# Patient Record
Sex: FEMALE | Race: WHITE | NOT HISPANIC OR LATINO | Employment: FULL TIME | URBAN - METROPOLITAN AREA
[De-identification: names, ages, dates, MRNs, and addresses within clinical notes are randomized per-mention and may not be internally consistent; named-entity substitution may affect disease eponyms.]

---

## 2021-02-17 ENCOUNTER — OFFICE VISIT (OUTPATIENT)
Dept: GASTROENTEROLOGY | Facility: CLINIC | Age: 21
End: 2021-02-17

## 2021-02-17 VITALS
HEIGHT: 63 IN | WEIGHT: 151 LBS | BODY MASS INDEX: 26.75 KG/M2 | TEMPERATURE: 97.8 F | SYSTOLIC BLOOD PRESSURE: 143 MMHG | HEART RATE: 95 BPM | DIASTOLIC BLOOD PRESSURE: 89 MMHG

## 2021-02-17 DIAGNOSIS — R11.2 CANNABINOID HYPEREMESIS SYNDROME: ICD-10-CM

## 2021-02-17 DIAGNOSIS — F12.90 CANNABINOID HYPEREMESIS SYNDROME: ICD-10-CM

## 2021-02-17 DIAGNOSIS — K21.9 GASTROESOPHAGEAL REFLUX DISEASE, UNSPECIFIED WHETHER ESOPHAGITIS PRESENT: Primary | ICD-10-CM

## 2021-02-17 DIAGNOSIS — R11.2 NON-INTRACTABLE VOMITING WITH NAUSEA, UNSPECIFIED VOMITING TYPE: ICD-10-CM

## 2021-02-17 DIAGNOSIS — F41.9 ANXIETY AND DEPRESSION: ICD-10-CM

## 2021-02-17 DIAGNOSIS — F32.A ANXIETY AND DEPRESSION: ICD-10-CM

## 2021-02-17 DIAGNOSIS — R13.19 ESOPHAGEAL DYSPHAGIA: ICD-10-CM

## 2021-02-17 DIAGNOSIS — F50.02 ANOREXIA NERVOSA, BINGE-EATING PURGING TYPE: ICD-10-CM

## 2021-02-17 PROCEDURE — 99204 OFFICE O/P NEW MOD 45 MIN: CPT | Performed by: INTERNAL MEDICINE

## 2021-02-17 RX ORDER — ONDANSETRON HYDROCHLORIDE 8 MG/1
1 TABLET, FILM COATED ORAL DAILY
Status: ON HOLD | COMMUNITY
Start: 2021-02-08 | End: 2022-06-19

## 2021-02-17 RX ORDER — ETHINYL ESTRADIOL/DROSPIRENONE 0.02-3(28)
1 TABLET ORAL DAILY
COMMUNITY
Start: 2021-01-31 | End: 2021-06-24 | Stop reason: SDUPTHER

## 2021-02-17 RX ORDER — METOCLOPRAMIDE 10 MG/1
TABLET ORAL
Qty: 90 TABLET | Refills: 2 | Status: SHIPPED | OUTPATIENT
Start: 2021-02-17 | End: 2021-06-24

## 2021-02-17 RX ORDER — HYDROXYZINE HYDROCHLORIDE 25 MG/1
1 TABLET, FILM COATED ORAL DAILY
Status: ON HOLD | COMMUNITY
Start: 2021-02-11 | End: 2022-06-19

## 2021-02-17 NOTE — PROGRESS NOTES
GASTROENTEROLOGY OFFICE NOTE  Margaret Palmer  [id number]  2000    CARE TEAM  Patient Care Team:  Provider, No Known as PCP - General    No ref. provider found     Chief Complaint   Patient presents with   • Abdominal Pain   • Nausea        HISTORY OF PRESENT ILLNESS:  Very pleasant 20-year-old white female referred to me by her neighbor Dr. Donn Garcia.    She presents with complaints of nausea and vomiting.    She has a history of eating disorder with history of bulimia, purging and in addition to this has issues with anxiety and depression for which she self medicates with marijuana on a regular basis.    She has been having problems with nausea for many years on a daily basis.  She has had problems with vomiting which was worse when she was in high school and college and over the past year after graduating from college has not been as problematic but the nausea continues to be particularly troublesome for her.    She does have regurgitation of food sometimes ingested night prior and complains of postprandial abdominal fullness, bloating, distention and regurgitation.    2 weeks ago she discontinued cannabis use and is feeling better in terms of her nausea.    In terms of her eating disorder she very rarely purges anymore and struggles more with the bulimia aspect of her eating disorder.    She does have some issues with feeling like bread and pills get stuck or hung up in her esophagus and admits having to chew carefully, eat slowly and having to drink fluids to avoid the sensation of food getting impacted in her esophagus.  She denies odynophagia or early satiety and has not had any unexplained weight loss nor any melena/bright red blood per rectum.    She is here today for further work-up and evaluation of these ongoing GI complaints not the least of which is her persistent nausea which has improved with cannabis cessation.    PAST MEDICAL HISTORY  Past Medical History:   Diagnosis Date   • Anxiety  and depression    • Eating disorder         PAST SURGICAL HISTORY  Past Surgical History:   Procedure Laterality Date   • ANKLE SURGERY     • WISDOM TOOTH EXTRACTION          MEDICATIONS:    Current Outpatient Medications:   •  hydrOXYzine (ATARAX) 25 MG tablet, Take 1 tablet by mouth Daily., Disp: , Rfl:   •  Latrice 3-0.02 MG per tablet, Take 1 tablet by mouth Daily., Disp: , Rfl:   •  ondansetron (ZOFRAN) 8 MG tablet, Take 1 tablet by mouth Daily., Disp: , Rfl:     ALLERGIES  No Known Allergies    FAMILY HISTORY:  Family History   Problem Relation Age of Onset   • Colon cancer Neg Hx    • Colon polyps Neg Hx        SOCIAL HISTORY  Social History     Socioeconomic History   • Marital status: Single     Spouse name: Not on file   • Number of children: Not on file   • Years of education: Not on file   • Highest education level: Not on file   Tobacco Use   • Smoking status: Never Smoker   • Smokeless tobacco: Never Used   Substance and Sexual Activity   • Alcohol use: Yes     Comment: rarely   • Drug use: Yes     Types: Marijuana   • Sexual activity: Yes     Birth control/protection: OCP     Socioeconomic History:  She graduated with a chemistry degree from college last year but 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  She is single and does not have any children.  She rarely drinks alcoholic beverages and does not smoke cigarettes.  She does smoke pot regularly up until about 2 weeks ago.       REVIEW OF SYSTEMS  Review of Systems   Constitutional: Positive for fatigue. Negative for activity change, appetite change, chills, diaphoresis, fever, unexpected weight gain and unexpected weight loss.   HENT: Negative for congestion, dental problem, drooling, ear discharge, ear pain, facial swelling, hearing loss, mouth sores, nosebleeds, postnasal drip, rhinorrhea, sinus pressure, sneezing, sore throat, swollen glands, tinnitus, trouble swallowing and voice change.    Respiratory: Negative for apnea,  "cough, choking, chest tightness, shortness of breath, wheezing and stridor.    Cardiovascular: Negative for chest pain, palpitations and leg swelling.   Gastrointestinal: Positive for abdominal pain and nausea. Negative for abdominal distention, anal bleeding, blood in stool, constipation, diarrhea, rectal pain, vomiting, GERD and indigestion.   Endocrine: Negative for cold intolerance, heat intolerance, polydipsia, polyphagia and polyuria.   Musculoskeletal: Negative for arthralgias, back pain, gait problem, joint swelling, myalgias, neck pain, neck stiffness and bursitis.   Allergic/Immunologic: Negative for environmental allergies, food allergies and immunocompromised state.   Neurological: Negative for dizziness, tremors, seizures, syncope, facial asymmetry, speech difficulty, weakness, light-headedness, numbness, headache, memory problem and confusion.   Hematological: Negative for adenopathy. Does not bruise/bleed easily.   Psychiatric/Behavioral: Negative for agitation, behavioral problems, decreased concentration, dysphoric mood, hallucinations, self-injury, sleep disturbance, suicidal ideas, negative for hyperactivity, depressed mood and stress. The patient is not nervous/anxious.      I reviewed the above-noted review of systems.  Pertinent/active issues are those noted today's HPI    PHYSICAL EXAM   /89 (BP Location: Right arm, Patient Position: Sitting, Cuff Size: Adult)   Pulse 95   Temp 97.8 °F (36.6 °C) (Temporal)   Ht 160 cm (63\")   Wt 68.5 kg (151 lb)   BMI 26.75 kg/m²   General: Alert and oriented x 3. In no apparent or acute distress.  and No stigmata of chronic liver disease  HEENT: Anicteric sclera.  Wearing facemask.  Neck: Supple. Without lymphadenopathy  CV: Regular rate and rhythm, S1, S2  Lungs: Clear to ausculation. Without rales, rhonchi and wheezing  Abdomen:  Soft,non-distended without palpable masses or hepatosplenomeagaly, areas of rebound tenderness or guarding. "   Extremeties: without clubbing, cyanosis or edema  Neurologic:  Alert and oriented x 3 without focal motor or sensory deficits  Rectal exam: deferred       ASSESSMENT  1.-Cannabis induced hyperemesis syndrome  2.-Eating disorder  3.-Gastroparesis  4.-Dysphagia to solids  5.-Chronic GERD.  Multifactorial.  Rule out erosive esophagitis, eosinophilic esophagitis, H. pylori gastritis,  6.-Intermittent dysphagia to solids likely peptic stricture rule out eosinophilic esophagitis  7.-Anxiety depression.  Patient to address this formally with her primary care provider    PLAN  1.-Patient is counseled to establish herself with a primary care physician to better manage her anxiety and depression without the need for cannabis.  She understands importance of doing so particularly given the fact that she has noted improvement after discontinuing cannabis  2.-Cannabis cessation  3.-EGD for esophageal dilation and exclusion of eosinophilia, esophagitis, Gar's esophagus, H. pylori gastritis and celiac disease  4.-Further recommendation deferred pending her clinical progress.    Monroe Clements MD  2/17/2021   15:03 EST

## 2021-02-18 PROBLEM — F50.02 ANOREXIA NERVOSA, BINGE-EATING PURGING TYPE: Status: ACTIVE | Noted: 2021-02-18

## 2021-02-18 PROBLEM — R11.2 CANNABINOID HYPEREMESIS SYNDROME: Status: ACTIVE | Noted: 2021-02-18

## 2021-02-18 PROBLEM — R11.10 NON-INTRACTABLE VOMITING: Status: ACTIVE | Noted: 2021-02-18

## 2021-02-18 PROBLEM — F32.A ANXIETY AND DEPRESSION: Status: ACTIVE | Noted: 2021-02-18

## 2021-02-18 PROBLEM — F41.9 ANXIETY AND DEPRESSION: Status: ACTIVE | Noted: 2021-02-18

## 2021-02-18 PROBLEM — F50.029 ANOREXIA NERVOSA, BINGE-EATING PURGING TYPE: Status: ACTIVE | Noted: 2021-02-18

## 2021-02-18 PROBLEM — K21.9 GASTROESOPHAGEAL REFLUX DISEASE: Status: ACTIVE | Noted: 2021-02-18

## 2021-02-18 PROBLEM — R13.19 ESOPHAGEAL DYSPHAGIA: Status: ACTIVE | Noted: 2021-02-18

## 2021-02-18 PROBLEM — F12.90 CANNABINOID HYPEREMESIS SYNDROME: Status: ACTIVE | Noted: 2021-02-18

## 2021-02-25 ENCOUNTER — PRIOR AUTHORIZATION (OUTPATIENT)
Dept: GASTROENTEROLOGY | Facility: CLINIC | Age: 21
End: 2021-02-25

## 2021-03-01 ENCOUNTER — APPOINTMENT (OUTPATIENT)
Dept: PREADMISSION TESTING | Facility: HOSPITAL | Age: 21
End: 2021-03-01

## 2021-03-01 LAB — SARS-COV-2 RNA RESP QL NAA+PROBE: NOT DETECTED

## 2021-03-01 PROCEDURE — U0004 COV-19 TEST NON-CDC HGH THRU: HCPCS

## 2021-03-01 PROCEDURE — C9803 HOPD COVID-19 SPEC COLLECT: HCPCS

## 2021-03-04 ENCOUNTER — OUTSIDE FACILITY SERVICE (OUTPATIENT)
Dept: GASTROENTEROLOGY | Facility: CLINIC | Age: 21
End: 2021-03-04

## 2021-03-04 PROCEDURE — 43239 EGD BIOPSY SINGLE/MULTIPLE: CPT | Performed by: INTERNAL MEDICINE

## 2021-03-04 PROCEDURE — 43249 ESOPH EGD DILATION <30 MM: CPT | Performed by: INTERNAL MEDICINE

## 2021-03-04 PROCEDURE — 88305 TISSUE EXAM BY PATHOLOGIST: CPT | Performed by: INTERNAL MEDICINE

## 2021-03-05 ENCOUNTER — LAB REQUISITION (OUTPATIENT)
Dept: LAB | Facility: HOSPITAL | Age: 21
End: 2021-03-05

## 2021-03-05 DIAGNOSIS — R11.2 NAUSEA WITH VOMITING, UNSPECIFIED: ICD-10-CM

## 2021-03-08 LAB
CYTO UR: NORMAL
LAB AP CASE REPORT: NORMAL
LAB AP CLINICAL INFORMATION: NORMAL
LAB AP SPECIAL STAINS: NORMAL
PATH REPORT.FINAL DX SPEC: NORMAL
PATH REPORT.GROSS SPEC: NORMAL

## 2021-06-24 ENCOUNTER — OFFICE VISIT (OUTPATIENT)
Dept: OBSTETRICS AND GYNECOLOGY | Facility: CLINIC | Age: 21
End: 2021-06-24

## 2021-06-24 VITALS
HEIGHT: 63 IN | BODY MASS INDEX: 27.68 KG/M2 | WEIGHT: 156.2 LBS | SYSTOLIC BLOOD PRESSURE: 114 MMHG | DIASTOLIC BLOOD PRESSURE: 70 MMHG

## 2021-06-24 DIAGNOSIS — Z11.3 SCREENING EXAMINATION FOR STD (SEXUALLY TRANSMITTED DISEASE): ICD-10-CM

## 2021-06-24 DIAGNOSIS — Z30.40 ENCOUNTER FOR REFILL OF PRESCRIPTION FOR CONTRACEPTION: ICD-10-CM

## 2021-06-24 DIAGNOSIS — R30.0 DYSURIA: ICD-10-CM

## 2021-06-24 DIAGNOSIS — R39.15 URINARY URGENCY: ICD-10-CM

## 2021-06-24 DIAGNOSIS — N39.0 URINARY TRACT INFECTION WITHOUT HEMATURIA, SITE UNSPECIFIED: ICD-10-CM

## 2021-06-24 DIAGNOSIS — Z01.419 ENCOUNTER FOR ANNUAL ROUTINE GYNECOLOGICAL EXAMINATION: Primary | ICD-10-CM

## 2021-06-24 PROCEDURE — 99213 OFFICE O/P EST LOW 20 MIN: CPT | Performed by: OBSTETRICS & GYNECOLOGY

## 2021-06-24 PROCEDURE — 99395 PREV VISIT EST AGE 18-39: CPT | Performed by: OBSTETRICS & GYNECOLOGY

## 2021-06-24 RX ORDER — NITROFURANTOIN 25; 75 MG/1; MG/1
100 CAPSULE ORAL 2 TIMES DAILY
Qty: 14 CAPSULE | Refills: 0 | Status: ON HOLD | OUTPATIENT
Start: 2021-06-24 | End: 2022-06-19

## 2021-06-24 RX ORDER — ETHINYL ESTRADIOL/DROSPIRENONE 0.02-3(28)
1 TABLET ORAL DAILY
Qty: 84 TABLET | Refills: 3 | Status: SHIPPED | OUTPATIENT
Start: 2021-06-24 | End: 2022-06-24

## 2021-06-24 NOTE — PROGRESS NOTES
GYN Annual Exam     CC- Here for annual exam.     Subjective     HPI  Margaret Palmer is a 20 y.o. female established patient who presents for annual well woman exam. Her periods are regular every 28-30 days, lasting 5-6 days and are moderate and clots are present.  She reports mild-to-moderate dysmenorrhea.  Partner Status: Marital Status: single.  New Partners since last visit: yes.  Condom usage with IC: always.    The patient reports she has a urinary tract infection; symptoms of dysuria and urinary urgency present since Tuesday. A month ago, she had 2 UTIs back-to-back. She is currently taking AZO; reports urine is bright orange today. Patient states she had extensive STD testing with her UTI last month.    She exercises regularly: yes.  She has concerns about domestic violence: no.    OB History        0    Para   0    Term   0       0    AB   0    Living   0       SAB   0    TAB   0    Ectopic   0    Molar   0    Multiple   0    Live Births   0                Current contraception: OCP (estrogen/progesterone)  History of abnormal Pap smear: no  Family history of uterine, colon or ovarian cancer: no  Family history of breast cancer: yes - [facility]  H/o STDs: no  Last pap: 2020 - negative, G&C negative  Gardasil:completed  Thromboembolic Disease: none    Health Maintenance   Topic Date Due   • Annual Gynecologic Pelvic and Breast Exam  Never done   • ANNUAL PHYSICAL  Never done   • HPV VACCINES (1 - 2-dose series) Never done   • TDAP/TD VACCINES (1 - Tdap) Never done   • HEPATITIS C SCREENING  Never done   • CHLAMYDIA SCREENING  Never done   • COVID-19 Vaccine (2 - Pfizer 2-dose series) 2021   • INFLUENZA VACCINE  2021   • PAP SMEAR  2023   • Pneumococcal Vaccine 0-64  Aged Out   • MENINGOCOCCAL VACCINE  Aged Out       The following portions of the patient's history were reviewed and updated as appropriate: allergies, current medications, past family history, past medical  "history, past social history, past surgical history and problem list.    Review of Systems  Review of Systems   Constitutional: Negative.    HENT: Negative.    Eyes: Negative.    Respiratory: Negative.    Cardiovascular: Negative.    Gastrointestinal: Negative.    Endocrine: Negative.    Genitourinary: Positive for dysuria.   Musculoskeletal: Negative.    Skin: Negative.    Allergic/Immunologic: Negative.    Neurological: Negative.    Hematological: Negative.    Psychiatric/Behavioral: Negative.        I have reviewed and agree with the HPI, ROS, and historical information as entered above. Sharmin Preston MD      Past Medical History:   Diagnosis Date   • Anxiety and depression    • Gardasil injection series complete    • Hirsutism    • History of eating disorder    • PCOS (polycystic ovarian syndrome)         Past Surgical History:   Procedure Laterality Date   • ANKLE SURGERY Right    • ENDOSCOPY  03/2021    for GI issues; normal, per patient   • WISDOM TOOTH EXTRACTION            Objective   /70 (BP Location: Right arm, Patient Position: Sitting, Cuff Size: Adult)   Ht 160 cm (63\")   Wt 70.9 kg (156 lb 3.2 oz)   LMP 06/19/2021 (Exact Date)   Breastfeeding No   BMI 27.67 kg/m²     Physical Exam  Vitals and nursing note reviewed. Exam conducted with a chaperone present.   Constitutional:       General: She is awake.   HENT:      Head: Normocephalic and atraumatic.   Neck:      Thyroid: No thyroid mass, thyromegaly or thyroid tenderness.   Cardiovascular:      Rate and Rhythm: Normal rate.      Heart sounds: No murmur heard.   No friction rub. No gallop.    Pulmonary:      Effort: Pulmonary effort is normal.      Breath sounds: Normal breath sounds. No stridor. No wheezing, rhonchi or rales.   Chest:      Chest wall: No mass or tenderness.      Breasts:         Right: Normal. No bleeding, inverted nipple, mass, nipple discharge, skin change or tenderness.         Left: Normal. No bleeding, inverted " nipple, mass, nipple discharge, skin change or tenderness.   Abdominal:      Palpations: Abdomen is soft.      Tenderness: There is no guarding or rebound.      Hernia: There is no hernia in the left inguinal area or right inguinal area.   Genitourinary:     General: Normal vulva.      Pubic Area: No rash.       Labia:         Right: No rash, tenderness, lesion or injury.         Left: No rash, tenderness, lesion or injury.       Urethra: No prolapse, urethral swelling or urethral lesion.      Vagina: No foreign body. No vaginal discharge, erythema, tenderness, bleeding or lesions.      Cervix: No cervical motion tenderness, discharge, friability, lesion, erythema or cervical bleeding.      Uterus: Normal. Not deviated, not enlarged, not fixed and not tender.       Adnexa: Right adnexa normal and left adnexa normal.        Right: No mass, tenderness or fullness.          Left: No mass, tenderness or fullness.        Rectum: No external hemorrhoid.   Musculoskeletal:      Cervical back: Normal range of motion.   Lymphadenopathy:      Upper Body:      Right upper body: No supraclavicular or axillary adenopathy.      Left upper body: No supraclavicular or axillary adenopathy.   Skin:     General: Skin is warm.   Neurological:      Mental Status: She is alert and oriented to person, place, and time.   Psychiatric:         Mood and Affect: Mood and affect normal.         Speech: Speech normal.          Assessment   Assessment  Problem List Items Addressed This Visit     None      Visit Diagnoses     Encounter for annual routine gynecological examination    -  Primary    Relevant Orders    Pap IG, Ct-Ng, Rfx HPV ASCU    Screening examination for STD (sexually transmitted disease)        Encounter for refill of prescription for contraception        Relevant Medications    Latrice 3-0.02 MG per tablet    Urinary urgency        Relevant Orders    Urine Culture - Urine, Urine, Clean Catch    Dysuria        Relevant Orders     Urine Culture - Urine, Urine, Clean Catch    Urinary tract infection without hematuria, site unspecified        Relevant Medications    nitrofurantoin, macrocrystal-monohydrate, (Macrobid) 100 MG capsule            Assessment     Problem List Items Addressed This Visit     None      Visit Diagnoses     Encounter for annual routine gynecological examination    -  Primary    Relevant Orders    Pap IG, Ct-Ng, Rfx HPV ASCU    Screening examination for STD (sexually transmitted disease)        Encounter for refill of prescription for contraception        Relevant Medications    Latrice 3-0.02 MG per tablet    Urinary urgency        Relevant Orders    Urine Culture - Urine, Urine, Clean Catch    Dysuria        Relevant Orders    Urine Culture - Urine, Urine, Clean Catch    Urinary tract infection without hematuria, site unspecified        Relevant Medications    nitrofurantoin, macrocrystal-monohydrate, (Macrobid) 100 MG capsule            Plan     1. Encouraged use of condoms for STD prevention.  2. Reviewed monthly self breast exams.  Instructed to call with lumps, pain, or breast discharge.    3. RTC in 1 year or PRN with problems  4. Doing well on OCPs  5. Pt with recent UTI that returned just after completing antibiotics, but per pt the culture was negative.  Symptomatic today, so we will treat with antibiotics and culture sent.  We will contact the patient with results.  We discussed voiding after intercourse and possible prophylactic antibiotics with intercourse if her symptoms seem to be related to intercourse.  We also discussed possible referral to urology to evaluate further.  Questionable bladder spasms.  Patient currently on Azo today.         Sharmin Preston MD  06/24/2021

## 2021-06-24 NOTE — PATIENT INSTRUCTIONS
Breast Self-Awareness  Breast self-awareness is knowing how your breasts look and feel. Doing breast self-awareness is important. It allows you to catch a breast problem early while it is still small and can be treated. All women should do breast self-awareness, including women who have had breast implants. Tell your doctor if you notice a change in your breasts.  What you need:  · A mirror.  · A well-lit room.  How to do a breast self-exam  A breast self-exam is one way to learn what is normal for your breasts and to check for changes. To do a breast self-exam:  Look for changes    1. Take off all the clothes above your waist.  2.  front of a mirror in a room with good lighting.  3. Put your hands on your hips.  4. Push your hands down.  5. Look at your breasts and nipples in the mirror to see if one breast or nipple looks different from the other. Check to see if:  ? The shape of one breast is different.  ? The size of one breast is different.  ? There are wrinkles, dips, and bumps in one breast and not the other.  6. Look at each breast for changes in the skin, such as:  ? Redness.  ? Scaly areas.  7. Look for changes in your nipples, such as:  ? Liquid around the nipples.  ? Bleeding.  ? Dimpling.  ? Redness.  ? A change in where the nipples are.  Feel for changes    1. Lie on your back on the floor.  2. Feel each breast. To do this, follow these steps:  ? Pick a breast to feel.  ? Put the arm closest to that breast above your head.  ? Use your other arm to feel the nipple area of your breast. Feel the area with the pads of your three middle fingers by making small circles with your fingers. For the first [place], press lightly. For the second [place], press harder. For the third [place], press even harder.  ? Keep making circles with your fingers at the different pressures as you move down your breast. Stop when you feel your ribs.  ? Move your fingers a little toward the center of your body.  ? Start  making circles with your fingers again, this time going up until you reach your collarbone.  ? Keep making up-and-down circles until you reach your armpit. Remember to keep using the three pressures.  ? Feel the other breast in the same way.  3. Sit or  the tub or shower.  4. With soapy water on your skin, feel each breast the same way you did in step 2 when you were lying on the floor.  Write down what you find  Writing down what you find can help you remember what to tell your doctor. Write down:  · What is normal for each breast.  · Any changes you find in each breast, including:  ? The kind of changes you find.  ? Whether you have pain.  ? Size and location of any lumps.  · When you last had your menstrual period.  General tips  · Check your breasts every month.  · If you are breastfeeding, the best time to check your breasts is after you feed your baby or after you use a breast pump.  · If you get menstrual periods, the best time to check your breasts is 5-7 days after your menstrual period is over.  · With time, you will become comfortable with the self-exam, and you will begin to know if there are changes in your breasts.  Contact a doctor if you:  · See a change in the shape or size of your breasts or nipples.  · See a change in the skin of your breast or nipples, such as red or scaly skin.  · Have fluid coming from your nipples that is not normal.  · Find a lump or thick area that was not there before.  · Have pain in your breasts.  · Have any concerns about your breast health.  Summary  · Breast self-awareness includes looking for changes in your breasts, as well as feeling for changes within your breasts.  · Breast self-awareness should be done in front of a mirror in a well-lit room.  · You should check your breasts every month. If you get menstrual periods, the best time to check your breasts is 5-7 days after your menstrual period is over.  · Let your doctor know of any changes you see in your  breasts, including changes in size, changes on the skin, pain or tenderness, or fluid from your nipples that is not normal.  This information is not intended to replace advice given to you by your health care provider. Make sure you discuss any questions you have with your health care provider.  Document Revised: 08/06/2019 Document Reviewed: 08/06/2019  Elsevier Patient Education © 2021 [employer]

## 2021-06-27 LAB
BACTERIA UR CULT: ABNORMAL
BACTERIA UR CULT: ABNORMAL

## 2022-01-05 ENCOUNTER — TELEPHONE (OUTPATIENT)
Dept: FAMILY MEDICINE CLINIC | Facility: CLINIC | Age: 22
End: 2022-01-05

## 2022-01-05 ENCOUNTER — TELEPHONE (OUTPATIENT)
Dept: OBSTETRICS AND GYNECOLOGY | Facility: CLINIC | Age: 22
End: 2022-01-05

## 2022-01-05 NOTE — TELEPHONE ENCOUNTER
Patient called and stated that she is overseas and is needing a letter of Covid recovery from a doctor in order to be able to fly back home to states. Patient was diagnosed with Covid overseas and quarantined there - only had mild symptoms, did not see doctor there.

## 2022-01-05 NOTE — TELEPHONE ENCOUNTER
"Spoke with patient who explains that she started having Covid symptoms on 12/27/2021 and tested positive for Covid on 12/30/2021 while in [place]. Her symptoms included a headache and congestion. Denies ever having a fever. She states that she has not had any symptoms since 01/01/2022. Per patient, starting on day 6 of quarantine, they can start taking \"lateral flow tests\" to see if they can come out of isolation sooner than the 10 days. She has consistently tested positive on those. She is out of quarantine tomorrow. She is requesting a letter of recovery to give the airport so that she is able to fly home. She is unable to even schedule a flight out until she receives this letter or has a negative antigen test.     She states that she does not have a PCP and has reached out to a couple offices in [place], but they are denying her the letter since she has never seen them in office. Last office visit with Dr. Preston was 06/24/2021.     Patient states she has been unable to see a PCP in [place] because their appts are weeks out. She also doesn't have a means of transportation to the office as she can't use public transportation due to her being quarantined. She said they have online requests but they cost around $175, so she was trying to avoid that if at all possible.     I advised the patient that I would discuss with Dr. Preston tomorrow and CB with plan of care.   "

## 2022-01-05 NOTE — TELEPHONE ENCOUNTER
Caller: ANNIKA MARINO     Relationship: FATHER     Best call back number: (PATIENT) [phone]    What is the best time to reach you: ANYTIME     Who are you requesting to speak with (clinical staff, provider,  specific staff member): CLINICAL STAFF     What was the call regarding: PATIENT'S FATHER IS ASKING FOR ASSISTANCE WITH HIS DAUGHTER WHO IS GOING TO BECOME A NEW PATIENT. SHE IS NEEDING A CERTIFICATE OF RECOVERY. SHE CONTACTED COVID WHILE ON A TRIP TO THE [place]. SHE IS UNABLE TO DO SO IN THE [place] SUCCESSFULLY. THEY ARE HOPING THAT DR. SALAS WOULD BE ABLE TO HELP WITH THIS PRIOR TO SEEING HER.     Do you require a callback: YES

## 2022-01-06 ENCOUNTER — TELEPHONE (OUTPATIENT)
Dept: OBSTETRICS AND GYNECOLOGY | Facility: CLINIC | Age: 22
End: 2022-01-06

## 2022-01-06 NOTE — TELEPHONE ENCOUNTER
Spoke with Dr. Preston about getting the patient a letter of recovery to permit her to fly back to the [place]. Patient informed that Dr. Preston wrote the letter and the signed document is being sent to her email.     Patient V/U.

## 2022-06-18 ENCOUNTER — HOSPITAL ENCOUNTER (OUTPATIENT)
Facility: HOSPITAL | Age: 22
Setting detail: OBSERVATION
Discharge: HOME | End: 2022-06-20
Attending: EMERGENCY MEDICINE | Admitting: INTERNAL MEDICINE

## 2022-06-18 ENCOUNTER — APPOINTMENT (OUTPATIENT)
Dept: CT IMAGING | Facility: HOSPITAL | Age: 22
End: 2022-06-18

## 2022-06-18 DIAGNOSIS — N10 PYELONEPHRITIS, ACUTE: Primary | ICD-10-CM

## 2022-06-18 LAB
ALBUMIN SERPL-MCNC: 3.2 G/DL (ref 3.5–5.2)
ALBUMIN/GLOB SERPL: 1.1 G/DL
ALP SERPL-CCNC: 61 U/L (ref 39–117)
ALT SERPL W P-5'-P-CCNC: 7 U/L (ref 1–33)
ANION GAP SERPL CALCULATED.3IONS-SCNC: 11 MMOL/L (ref 5–15)
AST SERPL-CCNC: 11 U/L (ref 1–32)
B-HCG UR QL: NEGATIVE
BACTERIA UR QL AUTO: ABNORMAL /HPF
BASOPHILS # BLD AUTO: 0 10*3/MM3 (ref 0–0.2)
BASOPHILS NFR BLD AUTO: 0 % (ref 0–1.5)
BILIRUB SERPL-MCNC: 0.2 MG/DL (ref 0–1.2)
BILIRUB UR QL STRIP: NEGATIVE
BUN SERPL-MCNC: 13 MG/DL (ref 6–20)
BUN/CREAT SERPL: 18.3 (ref 7–25)
CALCIUM SPEC-SCNC: 7.9 MG/DL (ref 8.6–10.5)
CHLORIDE SERPL-SCNC: 103 MMOL/L (ref 98–107)
CLARITY UR: CLEAR
CO2 SERPL-SCNC: 20 MMOL/L (ref 22–29)
COLOR UR: YELLOW
CREAT SERPL-MCNC: 0.71 MG/DL (ref 0.57–1)
D-LACTATE SERPL-SCNC: 1.1 MMOL/L (ref 0.5–2)
DEPRECATED RDW RBC AUTO: 37.7 FL (ref 37–54)
EGFRCR SERPLBLD CKD-EPI 2021: 122.7 ML/MIN/1.73
EOSINOPHIL # BLD AUTO: 0.01 10*3/MM3 (ref 0–0.4)
EOSINOPHIL NFR BLD AUTO: 0.1 % (ref 0.3–6.2)
ERYTHROCYTE [DISTWIDTH] IN BLOOD BY AUTOMATED COUNT: 11.9 % (ref 12.3–15.4)
GLOBULIN UR ELPH-MCNC: 2.9 GM/DL
GLUCOSE SERPL-MCNC: 137 MG/DL (ref 65–99)
GLUCOSE UR STRIP-MCNC: NEGATIVE MG/DL
HCT VFR BLD AUTO: 33.6 % (ref 34–46.6)
HGB BLD-MCNC: 11.5 G/DL (ref 12–15.9)
HGB UR QL STRIP.AUTO: ABNORMAL
HYALINE CASTS UR QL AUTO: ABNORMAL /LPF
IMM GRANULOCYTES # BLD AUTO: 0.03 10*3/MM3 (ref 0–0.05)
IMM GRANULOCYTES NFR BLD AUTO: 0.3 % (ref 0–0.5)
KETONES UR QL STRIP: NEGATIVE
LEUKOCYTE ESTERASE UR QL STRIP.AUTO: ABNORMAL
LIPASE SERPL-CCNC: 22 U/L (ref 13–60)
LYMPHOCYTES # BLD AUTO: 0.66 10*3/MM3 (ref 0.7–3.1)
LYMPHOCYTES NFR BLD AUTO: 7.6 % (ref 19.6–45.3)
MCH RBC QN AUTO: 29.9 PG (ref 26.6–33)
MCHC RBC AUTO-ENTMCNC: 34.2 G/DL (ref 31.5–35.7)
MCV RBC AUTO: 87.3 FL (ref 79–97)
MONOCYTES # BLD AUTO: 0.67 10*3/MM3 (ref 0.1–0.9)
MONOCYTES NFR BLD AUTO: 7.7 % (ref 5–12)
NEUTROPHILS NFR BLD AUTO: 7.32 10*3/MM3 (ref 1.7–7)
NEUTROPHILS NFR BLD AUTO: 84.3 % (ref 42.7–76)
NITRITE UR QL STRIP: NEGATIVE
NRBC BLD AUTO-RTO: 0 /100 WBC (ref 0–0.2)
PH UR STRIP.AUTO: 7 [PH] (ref 5–8)
PLATELET # BLD AUTO: 167 10*3/MM3 (ref 140–450)
PMV BLD AUTO: 9 FL (ref 6–12)
POTASSIUM SERPL-SCNC: 3.5 MMOL/L (ref 3.5–5.2)
PROT SERPL-MCNC: 6.1 G/DL (ref 6–8.5)
PROT UR QL STRIP: NEGATIVE
RBC # BLD AUTO: 3.85 10*6/MM3 (ref 3.77–5.28)
RBC # UR STRIP: ABNORMAL /HPF
REF LAB TEST METHOD: ABNORMAL
SODIUM SERPL-SCNC: 134 MMOL/L (ref 136–145)
SP GR UR STRIP: 1.01 (ref 1–1.03)
SQUAMOUS #/AREA URNS HPF: ABNORMAL /HPF
UROBILINOGEN UR QL STRIP: ABNORMAL
WBC # UR STRIP: ABNORMAL /HPF
WBC NRBC COR # BLD: 8.69 10*3/MM3 (ref 3.4–10.8)

## 2022-06-18 PROCEDURE — 96365 THER/PROPH/DIAG IV INF INIT: CPT

## 2022-06-18 PROCEDURE — 82728 ASSAY OF FERRITIN: CPT | Performed by: NURSE PRACTITIONER

## 2022-06-18 PROCEDURE — 83540 ASSAY OF IRON: CPT | Performed by: NURSE PRACTITIONER

## 2022-06-18 PROCEDURE — 83690 ASSAY OF LIPASE: CPT | Performed by: NURSE PRACTITIONER

## 2022-06-18 PROCEDURE — 25010000002 CEFTRIAXONE PER 250 MG: Performed by: INTERNAL MEDICINE

## 2022-06-18 PROCEDURE — 74177 CT ABD & PELVIS W/CONTRAST: CPT

## 2022-06-18 PROCEDURE — 99285 EMERGENCY DEPT VISIT HI MDM: CPT

## 2022-06-18 PROCEDURE — G0378 HOSPITAL OBSERVATION PER HR: HCPCS

## 2022-06-18 PROCEDURE — 83605 ASSAY OF LACTIC ACID: CPT | Performed by: NURSE PRACTITIONER

## 2022-06-18 PROCEDURE — 87040 BLOOD CULTURE FOR BACTERIA: CPT | Performed by: NURSE PRACTITIONER

## 2022-06-18 PROCEDURE — 25010000002 KETOROLAC TROMETHAMINE PER 15 MG: Performed by: NURSE PRACTITIONER

## 2022-06-18 PROCEDURE — 84145 PROCALCITONIN (PCT): CPT | Performed by: NURSE PRACTITIONER

## 2022-06-18 PROCEDURE — 99219 PR INITIAL OBSERVATION CARE/DAY 50 MINUTES: CPT | Performed by: INTERNAL MEDICINE

## 2022-06-18 PROCEDURE — 25010000002 CEFTRIAXONE PER 250 MG: Performed by: NURSE PRACTITIONER

## 2022-06-18 PROCEDURE — 85025 COMPLETE CBC W/AUTO DIFF WBC: CPT | Performed by: NURSE PRACTITIONER

## 2022-06-18 PROCEDURE — 81001 URINALYSIS AUTO W/SCOPE: CPT | Performed by: EMERGENCY MEDICINE

## 2022-06-18 PROCEDURE — 36415 COLL VENOUS BLD VENIPUNCTURE: CPT

## 2022-06-18 PROCEDURE — 80053 COMPREHEN METABOLIC PANEL: CPT | Performed by: NURSE PRACTITIONER

## 2022-06-18 PROCEDURE — 25010000002 IOPAMIDOL 61 % SOLUTION: Performed by: EMERGENCY MEDICINE

## 2022-06-18 PROCEDURE — 96361 HYDRATE IV INFUSION ADD-ON: CPT

## 2022-06-18 PROCEDURE — 96375 TX/PRO/DX INJ NEW DRUG ADDON: CPT

## 2022-06-18 PROCEDURE — 81025 URINE PREGNANCY TEST: CPT | Performed by: NURSE PRACTITIONER

## 2022-06-18 PROCEDURE — 84466 ASSAY OF TRANSFERRIN: CPT | Performed by: NURSE PRACTITIONER

## 2022-06-18 PROCEDURE — 87086 URINE CULTURE/COLONY COUNT: CPT | Performed by: NURSE PRACTITIONER

## 2022-06-18 RX ORDER — KETOROLAC TROMETHAMINE 15 MG/ML
15 INJECTION, SOLUTION INTRAMUSCULAR; INTRAVENOUS ONCE
Status: COMPLETED | OUTPATIENT
Start: 2022-06-18 | End: 2022-06-18

## 2022-06-18 RX ORDER — ACETAMINOPHEN 500 MG
1000 TABLET ORAL ONCE
Status: COMPLETED | OUTPATIENT
Start: 2022-06-18 | End: 2022-06-18

## 2022-06-18 RX ORDER — SODIUM CHLORIDE 0.9 % (FLUSH) 0.9 %
10 SYRINGE (ML) INJECTION AS NEEDED
Status: DISCONTINUED | OUTPATIENT
Start: 2022-06-18 | End: 2022-06-20 | Stop reason: HOSPADM

## 2022-06-18 RX ADMIN — ACETAMINOPHEN 1000 MG: 500 TABLET ORAL at 20:50

## 2022-06-18 RX ADMIN — IOPAMIDOL 100 ML: 612 INJECTION, SOLUTION INTRAVENOUS at 21:11

## 2022-06-18 RX ADMIN — SODIUM CHLORIDE 1 G: 9 INJECTION, SOLUTION INTRAVENOUS at 21:25

## 2022-06-18 RX ADMIN — SODIUM CHLORIDE 1000 ML: 9 INJECTION, SOLUTION INTRAVENOUS at 23:24

## 2022-06-18 RX ADMIN — SODIUM CHLORIDE 1 G: 9 INJECTION, SOLUTION INTRAVENOUS at 23:23

## 2022-06-18 RX ADMIN — KETOROLAC TROMETHAMINE 15 MG: 15 INJECTION, SOLUTION INTRAMUSCULAR; INTRAVENOUS at 20:45

## 2022-06-18 RX ADMIN — SODIUM CHLORIDE 1000 ML: 9 INJECTION, SOLUTION INTRAVENOUS at 20:48

## 2022-06-19 LAB
ANION GAP SERPL CALCULATED.3IONS-SCNC: 5 MMOL/L (ref 5–15)
BASOPHILS # BLD AUTO: 0.02 10*3/MM3 (ref 0–0.2)
BASOPHILS NFR BLD AUTO: 0.3 % (ref 0–1.5)
BUN SERPL-MCNC: 11 MG/DL (ref 6–20)
BUN/CREAT SERPL: 14.9 (ref 7–25)
CA-I SERPL ISE-MCNC: 1.13 MMOL/L (ref 1.12–1.32)
CALCIUM SPEC-SCNC: 7.9 MG/DL (ref 8.6–10.5)
CHLORIDE SERPL-SCNC: 107 MMOL/L (ref 98–107)
CO2 SERPL-SCNC: 24 MMOL/L (ref 22–29)
CREAT SERPL-MCNC: 0.74 MG/DL (ref 0.57–1)
DEPRECATED RDW RBC AUTO: 39.8 FL (ref 37–54)
EGFRCR SERPLBLD CKD-EPI 2021: 116.8 ML/MIN/1.73
EOSINOPHIL # BLD AUTO: 0.02 10*3/MM3 (ref 0–0.4)
EOSINOPHIL NFR BLD AUTO: 0.3 % (ref 0.3–6.2)
ERYTHROCYTE [DISTWIDTH] IN BLOOD BY AUTOMATED COUNT: 11.8 % (ref 12.3–15.4)
FERRITIN SERPL-MCNC: 110.2 NG/ML (ref 13–150)
FLUAV RNA RESP QL NAA+PROBE: NOT DETECTED
FLUBV RNA RESP QL NAA+PROBE: NOT DETECTED
FOLATE SERPL-MCNC: 11.7 NG/ML (ref 4.78–24.2)
GLUCOSE SERPL-MCNC: 140 MG/DL (ref 65–99)
HBA1C MFR BLD: 4.7 % (ref 4.8–5.6)
HCT VFR BLD AUTO: 33 % (ref 34–46.6)
HGB BLD-MCNC: 11 G/DL (ref 12–15.9)
IMM GRANULOCYTES # BLD AUTO: 0.02 10*3/MM3 (ref 0–0.05)
IMM GRANULOCYTES NFR BLD AUTO: 0.3 % (ref 0–0.5)
IRON 24H UR-MRATE: 11 MCG/DL (ref 37–145)
IRON 24H UR-MRATE: 11 MCG/DL (ref 37–145)
IRON SATN MFR SERPL: 3 % (ref 20–50)
LYMPHOCYTES # BLD AUTO: 1.54 10*3/MM3 (ref 0.7–3.1)
LYMPHOCYTES NFR BLD AUTO: 19.6 % (ref 19.6–45.3)
MCH RBC QN AUTO: 30.6 PG (ref 26.6–33)
MCHC RBC AUTO-ENTMCNC: 33.3 G/DL (ref 31.5–35.7)
MCV RBC AUTO: 91.9 FL (ref 79–97)
MONOCYTES # BLD AUTO: 0.74 10*3/MM3 (ref 0.1–0.9)
MONOCYTES NFR BLD AUTO: 9.4 % (ref 5–12)
NEUTROPHILS NFR BLD AUTO: 5.52 10*3/MM3 (ref 1.7–7)
NEUTROPHILS NFR BLD AUTO: 70.1 % (ref 42.7–76)
NRBC BLD AUTO-RTO: 0 /100 WBC (ref 0–0.2)
PLATELET # BLD AUTO: 166 10*3/MM3 (ref 140–450)
PMV BLD AUTO: 9 FL (ref 6–12)
POTASSIUM SERPL-SCNC: 3.9 MMOL/L (ref 3.5–5.2)
PROCALCITONIN SERPL-MCNC: 0.94 NG/ML (ref 0–0.25)
RBC # BLD AUTO: 3.59 10*6/MM3 (ref 3.77–5.28)
RETICS # AUTO: 0.04 10*6/MM3 (ref 0.02–0.13)
RETICS/RBC NFR AUTO: 1.05 % (ref 0.7–1.9)
SARS-COV-2 RNA RESP QL NAA+PROBE: NOT DETECTED
SODIUM SERPL-SCNC: 136 MMOL/L (ref 136–145)
TIBC SERPL-MCNC: 375 MCG/DL (ref 298–536)
TRANSFERRIN SERPL-MCNC: 252 MG/DL (ref 200–360)
VIT B12 BLD-MCNC: 249 PG/ML (ref 211–946)
WBC NRBC COR # BLD: 7.86 10*3/MM3 (ref 3.4–10.8)

## 2022-06-19 PROCEDURE — 25010000002 ONDANSETRON PER 1 MG: Performed by: PHYSICIAN ASSISTANT

## 2022-06-19 PROCEDURE — G0378 HOSPITAL OBSERVATION PER HR: HCPCS

## 2022-06-19 PROCEDURE — 96375 TX/PRO/DX INJ NEW DRUG ADDON: CPT

## 2022-06-19 PROCEDURE — C9803 HOPD COVID-19 SPEC COLLECT: HCPCS

## 2022-06-19 PROCEDURE — 99225 PR SBSQ OBSERVATION CARE/DAY 25 MINUTES: CPT | Performed by: INTERNAL MEDICINE

## 2022-06-19 PROCEDURE — 82607 VITAMIN B-12: CPT | Performed by: NURSE PRACTITIONER

## 2022-06-19 PROCEDURE — 96372 THER/PROPH/DIAG INJ SC/IM: CPT

## 2022-06-19 PROCEDURE — 87636 SARSCOV2 & INF A&B AMP PRB: CPT | Performed by: INTERNAL MEDICINE

## 2022-06-19 PROCEDURE — 85025 COMPLETE CBC W/AUTO DIFF WBC: CPT | Performed by: NURSE PRACTITIONER

## 2022-06-19 PROCEDURE — 25010000002 CEFTRIAXONE PER 250 MG: Performed by: INTERNAL MEDICINE

## 2022-06-19 PROCEDURE — 85045 AUTOMATED RETICULOCYTE COUNT: CPT | Performed by: NURSE PRACTITIONER

## 2022-06-19 PROCEDURE — 82330 ASSAY OF CALCIUM: CPT | Performed by: NURSE PRACTITIONER

## 2022-06-19 PROCEDURE — 80048 BASIC METABOLIC PNL TOTAL CA: CPT | Performed by: NURSE PRACTITIONER

## 2022-06-19 PROCEDURE — 82746 ASSAY OF FOLIC ACID SERUM: CPT | Performed by: NURSE PRACTITIONER

## 2022-06-19 PROCEDURE — 25010000002 ENOXAPARIN PER 10 MG: Performed by: NURSE PRACTITIONER

## 2022-06-19 PROCEDURE — 83036 HEMOGLOBIN GLYCOSYLATED A1C: CPT | Performed by: NURSE PRACTITIONER

## 2022-06-19 PROCEDURE — 96376 TX/PRO/DX INJ SAME DRUG ADON: CPT

## 2022-06-19 PROCEDURE — 96361 HYDRATE IV INFUSION ADD-ON: CPT

## 2022-06-19 RX ORDER — SODIUM CHLORIDE 0.9 % (FLUSH) 0.9 %
10 SYRINGE (ML) INJECTION EVERY 12 HOURS SCHEDULED
Status: DISCONTINUED | OUTPATIENT
Start: 2022-06-19 | End: 2022-06-20 | Stop reason: HOSPADM

## 2022-06-19 RX ORDER — ONDANSETRON 2 MG/ML
4 INJECTION INTRAMUSCULAR; INTRAVENOUS EVERY 6 HOURS PRN
Status: DISCONTINUED | OUTPATIENT
Start: 2022-06-19 | End: 2022-06-20 | Stop reason: HOSPADM

## 2022-06-19 RX ORDER — ACETAMINOPHEN 160 MG/5ML
650 SOLUTION ORAL EVERY 4 HOURS PRN
Status: DISCONTINUED | OUTPATIENT
Start: 2022-06-19 | End: 2022-06-20 | Stop reason: HOSPADM

## 2022-06-19 RX ORDER — CHOLECALCIFEROL (VITAMIN D3) 125 MCG
5 CAPSULE ORAL NIGHTLY PRN
Status: DISCONTINUED | OUTPATIENT
Start: 2022-06-19 | End: 2022-06-20 | Stop reason: HOSPADM

## 2022-06-19 RX ORDER — ENOXAPARIN SODIUM 100 MG/ML
40 INJECTION SUBCUTANEOUS EVERY 24 HOURS
Status: DISCONTINUED | OUTPATIENT
Start: 2022-06-19 | End: 2022-06-20 | Stop reason: HOSPADM

## 2022-06-19 RX ORDER — DROSPIRENONE AND ETHINYL ESTRADIOL 0.02-3(28)
1 KIT ORAL DAILY
Status: DISCONTINUED | OUTPATIENT
Start: 2022-06-19 | End: 2022-06-20 | Stop reason: HOSPADM

## 2022-06-19 RX ORDER — ACETAMINOPHEN 650 MG/1
650 SUPPOSITORY RECTAL EVERY 4 HOURS PRN
Status: DISCONTINUED | OUTPATIENT
Start: 2022-06-19 | End: 2022-06-20 | Stop reason: HOSPADM

## 2022-06-19 RX ORDER — SODIUM CHLORIDE, SODIUM LACTATE, POTASSIUM CHLORIDE, CALCIUM CHLORIDE 600; 310; 30; 20 MG/100ML; MG/100ML; MG/100ML; MG/100ML
100 INJECTION, SOLUTION INTRAVENOUS CONTINUOUS
Status: DISCONTINUED | OUTPATIENT
Start: 2022-06-19 | End: 2022-06-20 | Stop reason: HOSPADM

## 2022-06-19 RX ORDER — SODIUM CHLORIDE 0.9 % (FLUSH) 0.9 %
10 SYRINGE (ML) INJECTION AS NEEDED
Status: DISCONTINUED | OUTPATIENT
Start: 2022-06-19 | End: 2022-06-20 | Stop reason: HOSPADM

## 2022-06-19 RX ORDER — HYDROXYZINE HYDROCHLORIDE 25 MG/1
25 TABLET, FILM COATED ORAL DAILY
Status: DISCONTINUED | OUTPATIENT
Start: 2022-06-19 | End: 2022-06-20 | Stop reason: HOSPADM

## 2022-06-19 RX ORDER — SODIUM CHLORIDE, SODIUM LACTATE, POTASSIUM CHLORIDE, CALCIUM CHLORIDE 600; 310; 30; 20 MG/100ML; MG/100ML; MG/100ML; MG/100ML
75 INJECTION, SOLUTION INTRAVENOUS CONTINUOUS
Status: DISCONTINUED | OUTPATIENT
Start: 2022-06-19 | End: 2022-06-19

## 2022-06-19 RX ORDER — ACETAMINOPHEN 325 MG/1
650 TABLET ORAL EVERY 4 HOURS PRN
Status: DISCONTINUED | OUTPATIENT
Start: 2022-06-19 | End: 2022-06-20 | Stop reason: HOSPADM

## 2022-06-19 RX ADMIN — ACETAMINOPHEN 325MG 650 MG: 325 TABLET ORAL at 16:43

## 2022-06-19 RX ADMIN — HYDROXYZINE HYDROCHLORIDE 25 MG: 25 TABLET, FILM COATED ORAL at 08:57

## 2022-06-19 RX ADMIN — CEFTRIAXONE 2 G: 2 INJECTION, POWDER, FOR SOLUTION INTRAMUSCULAR; INTRAVENOUS at 20:45

## 2022-06-19 RX ADMIN — ONDANSETRON 4 MG: 2 INJECTION INTRAMUSCULAR; INTRAVENOUS at 06:19

## 2022-06-19 RX ADMIN — ACETAMINOPHEN 325MG 650 MG: 325 TABLET ORAL at 09:05

## 2022-06-19 RX ADMIN — Medication 10 ML: at 01:19

## 2022-06-19 RX ADMIN — ONDANSETRON 4 MG: 2 INJECTION INTRAMUSCULAR; INTRAVENOUS at 15:47

## 2022-06-19 RX ADMIN — SODIUM CHLORIDE, POTASSIUM CHLORIDE, SODIUM LACTATE AND CALCIUM CHLORIDE 75 ML/HR: 600; 310; 30; 20 INJECTION, SOLUTION INTRAVENOUS at 01:18

## 2022-06-19 RX ADMIN — ENOXAPARIN SODIUM 40 MG: 40 INJECTION SUBCUTANEOUS at 08:57

## 2022-06-19 RX ADMIN — DROSPIRENONE AND ETHINYL ESTRADIOL 1 TABLET: KIT at 08:30

## 2022-06-19 RX ADMIN — SODIUM CHLORIDE, POTASSIUM CHLORIDE, SODIUM LACTATE AND CALCIUM CHLORIDE 100 ML/HR: 600; 310; 30; 20 INJECTION, SOLUTION INTRAVENOUS at 16:56

## 2022-06-19 NOTE — H&P
[facility] Medicine Services  HISTORY AND PHYSICAL    Patient Name: Margaret Palmer  : 2000  MRN: [id number]  Primary Care Physician: Provider, No Known  Date of admission: 2022    Subjective   Subjective     Chief Complaint:  Right sided back pain    HPI:  Margaret Palmer is a 21 y.o. female with history of anxiety, depression, PCOS, presents the ED with complaints of right sided back pain with fevers.  Patient reports that on Monday she developed right-sided back pain that has progressively worsened over the last few days.  On Thursday her pain radiated to the suprapubic and right lower quadrant .  Friday night she developed fevers, chills, nausea, vomiting, dysuria, urgency, dizziness, light-headedness, and headaches.  Saturday she was seen at the [facility] and treated with oral antibiotics.  Her symptoms worsened throughout the day today which prompted her to come to the ED for evaluation.  No shortness of air, cough, chest pain, diarrhea, hematuria, or any other complaints at this time.  CT abdomen pelvis shows findings compatible with acute pyelonephritis of the right kidney, no evidence of urinary obstruction.  Patient was given a liter of saline and started on Rocephin in the ED.  Patient has been admitted to the hospital medicine service for further evaluation and management.        Review of Systems   Constitutional: Positive for chills and fever. Negative for appetite change and fatigue.   Eyes: Negative.    Respiratory: Negative.    Cardiovascular: Negative.    Gastrointestinal: Positive for abdominal pain, nausea and vomiting. Negative for constipation and diarrhea.   Endocrine: Negative.    Genitourinary: Positive for dysuria, flank pain and urgency.   Musculoskeletal: Positive for back pain. Negative for neck pain and neck stiffness.   Skin: Negative.    Allergic/Immunologic: Negative.    Neurological: Positive for dizziness, light-headedness and  headaches. Negative for syncope and weakness.   Hematological: Negative.    Psychiatric/Behavioral: Negative.         All other systems reviewed and are negative.     Personal History     Past Medical History:   Diagnosis Date   • Anxiety and depression    • Gardasil injection series complete    • Hirsutism    • History of eating disorder    • PCOS (polycystic ovarian syndrome)              Past Surgical History:   Procedure Laterality Date   • ANKLE SURGERY Right    • ENDOSCOPY  03/2021    for GI issues; normal, per patient   • WISDOM TOOTH EXTRACTION         Family History:  family history includes Arthritis in her maternal grandmother; Breast cancer in her paternal great-grandmother. Otherwise pertinent FHx was reviewed and unremarkable.     Social History:  reports that she has never smoked. She has never used smokeless tobacco. She reports current alcohol use. She reports current drug use. Frequency: 3.00 times per week. Drug: Marijuana.  Works as a scribe in dermatology office    Medications:  drospirenone-ethinyl estradiol, hydrOXYzine, nitrofurantoin (macrocrystal-monohydrate), and ondansetron    No Known Allergies    Objective   Objective     Vital Signs:   Temp:  [100.6 °F (38.1 °C)-102.7 °F (39.3 °C)] 102.7 °F (39.3 °C)  Heart Rate:  [100-128] 100  Resp:  [18] 18  BP: (121-140)/(68-84) 121/68    Physical Exam   Constitutional: Awake, alert, resting in bed  Eyes: PERRLA, sclerae anicteric, no conjunctival injection  HENT: NCAT, mucous membranes moist  Neck: Supple, no thyromegaly, no lymphadenopathy, trachea midline  Respiratory: Clear to auscultation bilaterally, nonlabored respirations   Cardiovascular: tachycardia, no murmurs, rubs, or gallops, palpable pedal pulses bilaterally  Gastrointestinal: Positive bowel sounds, soft, mild suprapubic tenderness, nondistended, mild right flank tenderness  Musculoskeletal: No bilateral ankle edema, no clubbing or cyanosis to extremities  Psychiatric: Appropriate  affect, cooperative  Neurologic: Oriented x 3, strength symmetric in all extremities, Cranial Nerves grossly intact to confrontation, speech clear  Skin: No rashes       Results Reviewed:  I have personally reviewed most recent indicated data and agree with findings including:  [x]  Laboratory  [x]  Radiology  []  EKG/Telemetry  []  Pathology  []  Cardiac/Vascular Studies  []  Old records  []  Other:  Most pertinent findings include: Hemoglobin 9.5, hematocrit 33.6, calcium 7.9, sodium 134    UA: Blood trace, leukocytes moderate, RBC 3-6, WBC 31-50, bacteria 3+      LAB RESULTS:      Lab 06/18/22 2129   WBC 8.69   HEMOGLOBIN 11.5*   HEMATOCRIT 33.6*   PLATELETS 167   NEUTROS ABS 7.32*   IMMATURE GRANS (ABS) 0.03   LYMPHS ABS 0.66*   MONOS ABS 0.67   EOS ABS 0.01   MCV 87.3   PROCALCITONIN 0.94*   LACTATE 1.1         Lab 06/18/22 2129   SODIUM 134*   POTASSIUM 3.5   CHLORIDE 103   CO2 20.0*   ANION GAP 11.0   BUN 13   CREATININE 0.71   EGFR 122.7   GLUCOSE 137*   CALCIUM 7.9*         Lab 06/18/22 2129   TOTAL PROTEIN 6.1   ALBUMIN 3.20*   GLOBULIN 2.9   ALT (SGPT) 7   AST (SGOT) 11   BILIRUBIN 0.2   ALK PHOS 61   LIPASE 22                     Brief Urine Lab Results  (Last result in the past 365 days)      Color   Clarity   Blood   Leuk Est   Nitrite   Protein   CREAT   Urine HCG        06/18/22 1952               Negative       06/18/22 1952 Yellow   Clear   Trace   Moderate (2+)   Negative   Negative               Microbiology Results (last 10 days)     ** No results found for the last 240 hours. **          CT Abdomen Pelvis With Contrast    Result Date: 6/18/2022  EXAM: CT OF THE ABDOMEN AND PELVIS WITH IV CONTRAST INDICATIONS: Right flank pain, fever, vomiting TECHNIQUE: CT scan of the abdomen and pelvis was performed following the administration of 100 mL Isovue-300 intravenous contrast. CT dose lowering techniques were used, to include: automated exposure control, adjustment for patient size, and / or use  of  iterative reconstruction. COMPARISON: No prior abdominal or pelvic CTs are in the system. FINDINGS: Bones: No destructive osseous lesions. Lung bases: Unremarkable ABDOMEN: Liver: The liver is normal in contour without focal lesion. The portal venous system is patent. Gallbladder and Bile Ducts: Unremarkable CT appearance of the gallbladder. There is no intrahepatic or extrahepatic biliary ductal dilatation. Spleen: There is homogeneous enhancement without focal lesion. Pancreas: The pancreatic parenchyma is unremarkable. There is no pancreatic ductal dilatation. Adrenals: Unremarkable without nodularity. Kidneys: There is no hydroureteronephrosis. There are patchy regions of cortical hypoenhancement in the right kidney and periureteral edema. The left kidney is unremarkable. Vasculature: No evidence of atherosclerosis or aneurysm. Nodes: There is no lymphadenopathy by size criteria. Bowel: The stomach and visualized loops of large and small bowel are unremarkable. An unremarkable appendix is identified. Mesentery/Peritoneum: Trace free fluid is present in the cul-de-sac. Soft Tissues: Unremarkable PELVIS: Pelvic Organs: There is no abnormal pelvic mass. The urinary bladder is unremarkable.     Impression: Findings compatible with acute pyelonephritis of the right kidney. There is no evidence of urinary obstruction. Electronically signed by:  Kam Morales M.D.  6/18/2022 7:43 PM Mountain Time          Assessment & Plan   Assessment & Plan       Pyelonephritis, acute    Anxiety and depression    Margaret Palmer is a 21 y.o. female with history of anxiety, depression, PCOS, is here with acute pyelonephritis    Assessment and Plan:    Acute pyelonephritis  -- CT abdomen pelvis shows findings compatible with acute pyelonephritis of the right kidney, no evidence of urinary obstruction  --UA: Leukocytosis moderate, RBC 3-6, WBC 31-50, bacteria 3+  --Was given a liter of saline in the ED  -- BC x2 pending  --  Urine culture pending  -- Rocephin  -- IV fluids  --Pain control  -- A.m. labs    Anxiety and depression  -- Continue Atarax    Anemia  -- Anemia profile  -- CBC in the a.m.    Hyperglycemia  -- A1c in the a.m.    DVT prophylaxis: Lovenox    CODE STATUS:    Level Of Support Discussed With: Patient  Code Status (Patient has no pulse and is not breathing): CPR (Attempt to Resuscitate)  Medical Interventions (Patient has pulse or is breathing): Full Support      This note has been completed as part of a split-shared workflow.     Signature: Electronically signed by TRUTPI Izquierdo, 06/19/22, 12:48 AM EDT.     Attending   Admission Attestation       I have seen and examined the patient, performing an independent face-to-face diagnostic evaluation with plan of care reviewed and developed with the advanced practice clinician (APC).      Brief Summary Statement:     Margaret Palmer is a 21 y.o. female with history of anxiety, depression, PCOS, presents the ED with complaints of right sided back pain with fevers. Patient is feeling better, fever has subsided, she is eating. She still has right flank pain..      Remainder of detailed HPI is as noted by APC and has been reviewed and/or edited by me for completeness.    Attending Physical Exam:  Temp:  [98.2 °F (36.8 °C)-102.7 °F (39.3 °C)] 98.2 °F (36.8 °C)  Heart Rate:  [100-128] 100  Resp:  [18] 18  BP: (121-140)/(68-84) 121/68    Patient is alert and talkative, nontoxic  Neck is without mass or JVD  Heart is Reg wo murmur  Lungs are clear wo wheeze or crackle  Abd is soft without HSM or mass, mild cva tenderness  MAEW  Skin is without rash  Neurologic is exam in non-focal   Mood is appropriate      Brief Assessment/Plan :  See detailed assessment and plan developed with APC which I have reviewed and/or edited for completeness.    Admission Status: I believe that this patient meets Inpatient  Status.    Katie Georges MD  06/19/22

## 2022-06-19 NOTE — PLAN OF CARE
Goal Outcome Evaluation:  Plan of Care Reviewed With: patient        Progress: no change  Outcome Evaluation: Patient arrived on the unit tonight with signs and symptoms related to pyelonephritis. Patient has had a decent shift, sleeping on and off tonight. VSS, and patient has been alert and oriented times four. No complaints of pain tonight. Nursing staff will continue to monitor and assess the patient.

## 2022-06-19 NOTE — PROGRESS NOTES
[facility] Medicine Services  PROGRESS NOTE    Patient Name: Margaret Palmer  : 2000  MRN: [id number]    Date of Admission: 2022  Primary Care Physician: Provider, No Known    Subjective   Subjective     CC:  pyelonephritis    HPI:  Still having some right flank pain. Elevated temp this am.  Pretty tired feeling. Hasn't eaten too much yet.     ROS:  Gen- + fever  CV- No chest pain, palpitations  Resp- No cough, dyspnea  GI- nausea  - right flank pain        Objective   Objective     Vital Signs:   Temp:  [98.2 °F (36.8 °C)-102.7 °F (39.3 °C)] 99.2 °F (37.3 °C)  Heart Rate:  [] 107  Resp:  [16-18] 16  BP: (105-140)/(62-84) 122/70     Physical Exam:  Constitutional - appears fatigued, in bed  HEENT-NCAT, mucous membranes moist  CV-RRR  Resp-CTAB, no wheezes, rhonchi or rales  Abd-soft, nontender, nondistended, normoactive bowel sounds  - slight tenderness right flank/CVA  Ext-No lower extremity cyanosis, clubbing or edema bilaterally  Neuro-alert and oriented, speech clear, moves all extremities   Psych-normal affect   Skin- No rash on exposed UE or LE bilaterally      Results Reviewed:  LAB RESULTS:      Lab 22  0403 22   WBC 7.86 8.69   HEMOGLOBIN 11.0* 11.5*   HEMATOCRIT 33.0* 33.6*   PLATELETS 166 167   NEUTROS ABS 5.52 7.32*   IMMATURE GRANS (ABS) 0.02 0.03   LYMPHS ABS 1.54 0.66*   MONOS ABS 0.74 0.67   EOS ABS 0.02 0.01   MCV 91.9 87.3   PROCALCITONIN  --  0.94*   LACTATE  --  1.1         Lab 22  0403 22  0028 22   SODIUM 136  --  134*   POTASSIUM 3.9  --  3.5   CHLORIDE 107  --  103   CO2 24.0  --  20.0*   ANION GAP 5.0  --  11.0   BUN 11  --  13   CREATININE 0.74  --  0.71   EGFR 116.8  --  122.7   GLUCOSE 140*  --  137*   CALCIUM 7.9*  --  7.9*   IONIZED CALCIUM  --  1.13  --    HEMOGLOBIN A1C 4.70*  --   --          Lab 228   TOTAL PROTEIN 6.1   ALBUMIN 3.20*   GLOBULIN 2.9   ALT (SGPT) 7   AST (SGOT)  11   BILIRUBIN 0.2   ALK PHOS 61   LIPASE 22                 Lab 06/18/22 2129   IRON 11*  11*   IRON SATURATION 3*   TIBC 375   TRANSFERRIN 252   FERRITIN 110.20         Brief Urine Lab Results  (Last result in the past 365 days)      Color   Clarity   Blood   Leuk Est   Nitrite   Protein   CREAT   Urine HCG        06/18/22 1952               Negative       06/18/22 1952 Yellow   Clear   Trace   Moderate (2+)   Negative   Negative                 Microbiology Results Abnormal     Procedure Component Value - Date/Time    Urine Culture - Urine, Urine, Clean Catch [[id number]]  (Normal) Collected: 06/18/22 1952    Lab Status: Preliminary result Specimen: Urine, Clean Catch Updated: 06/19/22 1219     Urine Culture No growth    COVID PRE-OP / PRE-PROCEDURE SCREENING ORDER (NO ISOLATION) - Swab, Nasopharynx [[id number]]  (Normal) Collected: 06/19/22 0025    Lab Status: Final result Specimen: Swab from Nasopharynx Updated: 06/19/22 0103    Narrative:      The following orders were created for panel order COVID PRE-OP / PRE-PROCEDURE SCREENING ORDER (NO ISOLATION) - Swab, Nasopharynx.  Procedure                               Abnormality         Status                     ---------                               -----------         ------                     COVID-19 and FLU A/B PCR...[[id number]]  Normal              Final result                 Please view results for these tests on the individual orders.    COVID-19 and FLU A/B PCR - Swab, Nasopharynx [[id number]]  (Normal) Collected: 06/19/22 0025    Lab Status: Final result Specimen: Swab from Nasopharynx Updated: 06/19/22 0103     COVID19 Not Detected     Influenza A PCR Not Detected     Influenza B PCR Not Detected    Narrative:      Fact sheet for providers: https://www.fda.gov/media/574485/download    Fact sheet for patients: https://www.fda.gov/media/773723/download    Test performed by PCR.          CT Abdomen Pelvis With Contrast    Result Date: 6/18/2022  EXAM: CT  OF THE ABDOMEN AND PELVIS WITH IV CONTRAST INDICATIONS: Right flank pain, fever, vomiting TECHNIQUE: CT scan of the abdomen and pelvis was performed following the administration of 100 mL Isovue-300 intravenous contrast. CT dose lowering techniques were used, to include: automated exposure control, adjustment for patient size, and / or use of  iterative reconstruction. COMPARISON: No prior abdominal or pelvic CTs are in the system. FINDINGS: Bones: No destructive osseous lesions. Lung bases: Unremarkable ABDOMEN: Liver: The liver is normal in contour without focal lesion. The portal venous system is patent. Gallbladder and Bile Ducts: Unremarkable CT appearance of the gallbladder. There is no intrahepatic or extrahepatic biliary ductal dilatation. Spleen: There is homogeneous enhancement without focal lesion. Pancreas: The pancreatic parenchyma is unremarkable. There is no pancreatic ductal dilatation. Adrenals: Unremarkable without nodularity. Kidneys: There is no hydroureteronephrosis. There are patchy regions of cortical hypoenhancement in the right kidney and periureteral edema. The left kidney is unremarkable. Vasculature: No evidence of atherosclerosis or aneurysm. Nodes: There is no lymphadenopathy by size criteria. Bowel: The stomach and visualized loops of large and small bowel are unremarkable. An unremarkable appendix is identified. Mesentery/Peritoneum: Trace free fluid is present in the cul-de-sac. Soft Tissues: Unremarkable PELVIS: Pelvic Organs: There is no abnormal pelvic mass. The urinary bladder is unremarkable.     Impression: Findings compatible with acute pyelonephritis of the right kidney. There is no evidence of urinary obstruction. Electronically signed by:  Kam Morales M.D.  6/18/2022 7:43 PM Mountain Time          I have reviewed the medications:  Scheduled Meds:cefTRIAXone, 2 g, Intravenous, Q24H  drospirenone-ethinyl estradiol, 1 tablet, Oral, Daily  enoxaparin, 40 mg, Subcutaneous,  Q24H  hydrOXYzine, 25 mg, Oral, Daily  sodium chloride, 10 mL, Intravenous, Q12H      Continuous Infusions:lactated ringers, 75 mL/hr, Last Rate: 75 mL/hr (06/19/22 0118)      PRN Meds:.•  acetaminophen **OR** acetaminophen **OR** acetaminophen  •  melatonin  •  ondansetron  •  [COMPLETED] Insert peripheral IV **AND** sodium chloride  •  sodium chloride    Assessment & Plan   Assessment & Plan     Active Hospital Problems    Diagnosis  POA   • **Pyelonephritis, acute [N10]  Yes   • Anxiety and depression [F41.9, F32.A]  Yes      Resolved Hospital Problems   No resolved problems to display.        Brief Hospital Course to date:  Margaret Palmer is a 21 y.o. female with history of anxiety, depression and PCOS presents with right flank pain of 5-6 days duration.     Acute pyelonephritis  Fever  - UA moderate LE, 31-50 WBC, 3+ bacteria  - CT abdomen pelvis findings compatible with acute pyelonephritis  - procalcitonin 0.94  - continue rocephin 2 gm IV q 24 hours  - follow cultures    Iron deficiency anemia  - consider iron supplementation after treatment for pyelonephritis  - needs PCP for followup after discharge.    Anxiety and Depression    Hyperglycemia  - a1c 4.7      DVT prophylaxis:  Medical DVT prophylaxis orders are present.       AM-PAC 6 Clicks Score (PT): 24 (06/19/22 0800)    Disposition: I expect the patient to be discharged home in 1-2 days when fever, abdominal pain and anorexia improve. She will need PCP for followup.    CODE STATUS:   Code Status and Medical Interventions:   Ordered at: 06/19/22 0048     Level Of Support Discussed With:    Patient     Code Status (Patient has no pulse and is not breathing):    CPR (Attempt to Resuscitate)     Medical Interventions (Patient has pulse or is breathing):    Full Support       Neeraj Stevens MD  06/19/22

## 2022-06-19 NOTE — ED PROVIDER NOTES
EMERGENCY DEPARTMENT ENCOUNTER    Pt Name: Margaret Palmer  MRN: [id number]  Pt :   2000  Room Number:  S217/1  Date of encounter:  2022  PCP: Provider, No Known  ED Provider: TRUPTI Zarate    Historian:  Patient and mom      HPI:  Chief Complaint: right flank pain        Context: Margaret Palmer is a 21 y.o. female who presents to the ED c/o right flank pain onset on Monday, 6 days ago.  Last evening she began to run a fever of 102.  She was seen at a urgent treatment center today and diagnosed with urinary tract infection but instructed to seek emergency care if she got worse.  Patient was surprised to learn that she had a UTI considering the fact that she has had no dysuria or frequency or urgency.  She does have a history of UTI frequently which typically are associated with dysuria.    Review of system is positive for chills and fever.  Negative for chest pain or cough or shortness of breath.  Positive for nausea and vomiting without diarrhea or abdominal pain.  Positive for flank pain.  Positive for generalized weakness without dizziness or syncope.  No neurosensory complaint or focal weakness      PAST MEDICAL HISTORY  Past Medical History:   Diagnosis Date   • Anxiety and depression    • Gardasil injection series complete    • Hirsutism    • History of eating disorder    • PCOS (polycystic ovarian syndrome)          PAST SURGICAL HISTORY  Past Surgical History:   Procedure Laterality Date   • ANKLE SURGERY Right    • ENDOSCOPY  2021    for GI issues; normal, per patient   • WISDOM TOOTH EXTRACTION           FAMILY HISTORY  Family History   Problem Relation Age of Onset   • Arthritis Maternal Grandmother    • Breast cancer Paternal Great-Grandmother    • Colon cancer Neg Hx    • Colon polyps Neg Hx    • Ovarian cancer Neg Hx          SOCIAL HISTORY  Social History     Socioeconomic History   • Marital status: Single   Tobacco Use   • Smoking status: Never Smoker   • Smokeless  tobacco: Never Used   Vaping Use   • Vaping Use: Never used   Substance and Sexual Activity   • Alcohol use: Yes     Comment: 2x weekly   • Drug use: Yes     Frequency: 3.0 times per week     Types: Marijuana   • Sexual activity: Yes     Partners: Male     Birth control/protection: OCP         ALLERGIES  Patient has no known allergies.        REVIEW OF SYSTEMS  Review of Systems     All systems reviewed and negative except for those discussed in HPI.       PHYSICAL EXAM    I have reviewed the triage vital signs and nursing notes.    ED Triage Vitals [06/18/22 1925]   Temp Heart Rate Resp BP SpO2   (!) 100.6 °F (38.1 °C) (!) 128 18 140/84 100 %      Temp src Heart Rate Source Patient Position BP Location FiO2 (%)   Oral Monitor Sitting Left arm --       Physical Exam  GENERAL:   Appears in no acute distress.  She is febrile.  She is tachycardia.  She is an excellent historian  HENT: Nares patent.  EYES: No scleral icterus.  CV: Regular rhythm, tachycardic.  No peripheral edema  RESPIRATORY: Normal effort.  No audible wheezes, rales or rhonchi.  ABDOMEN: Soft, nontender.  Mild right-sided CVA tenderness.  MUSCULOSKELETAL: No deformities.   NEURO: Alert, moves all extremities, follows commands.  SKIN: Warm, dry, no rash visualized.        LAB RESULTS  Recent Results (from the past 24 hour(s))   Urinalysis With Microscopic If Indicated (No Culture) - Urine, Clean Catch    Collection Time: 06/18/22  7:52 PM    Specimen: Urine, Clean Catch   Result Value Ref Range    Color, UA Yellow Yellow, Straw    Appearance, UA Clear Clear    pH, UA 7.0 5.0 - 8.0    Specific Gravity, UA 1.014 1.001 - 1.030    Glucose, UA Negative Negative    Ketones, UA Negative Negative    Bilirubin, UA Negative Negative    Blood, UA Trace (A) Negative    Protein, UA Negative Negative    Leuk Esterase, UA Moderate (2+) (A) Negative    Nitrite, UA Negative Negative    Urobilinogen, UA 1.0 E.U./dL 0.2 - 1.0 E.U./dL   Urinalysis, Microscopic Only -  Urine, Clean Catch    Collection Time: 06/18/22  7:52 PM    Specimen: Urine, Clean Catch   Result Value Ref Range    RBC, UA 3-6 (A) None Seen, 0-2 /HPF    WBC, UA 31-50 (A) None Seen, 0-2 /HPF    Bacteria, UA 3+ (A) None Seen, Trace /HPF    Squamous Epithelial Cells, UA 0-2 None Seen, 0-2 /HPF    Hyaline Casts, UA 0-6 0 - 6 /LPF    Methodology Automated Microscopy    Pregnancy, Urine - Urine, Clean Catch    Collection Time: 06/18/22  7:52 PM    Specimen: Urine, Clean Catch   Result Value Ref Range    HCG, Urine QL Negative Negative   Comprehensive Metabolic Panel    Collection Time: 06/18/22  9:29 PM    Specimen: Blood   Result Value Ref Range    Glucose 137 (H) 65 - 99 mg/dL    BUN 13 6 - 20 mg/dL    Creatinine 0.71 0.57 - 1.00 mg/dL    Sodium 134 (L) 136 - 145 mmol/L    Potassium 3.5 3.5 - 5.2 mmol/L    Chloride 103 98 - 107 mmol/L    CO2 20.0 (L) 22.0 - 29.0 mmol/L    Calcium 7.9 (L) 8.6 - 10.5 mg/dL    Total Protein 6.1 6.0 - 8.5 g/dL    Albumin 3.20 (L) 3.50 - 5.20 g/dL    ALT (SGPT) 7 1 - 33 U/L    AST (SGOT) 11 1 - 32 U/L    Alkaline Phosphatase 61 39 - 117 U/L    Total Bilirubin 0.2 0.0 - 1.2 mg/dL    Globulin 2.9 gm/dL    A/G Ratio 1.1 g/dL    BUN/Creatinine Ratio 18.3 7.0 - 25.0    Anion Gap 11.0 5.0 - 15.0 mmol/L    eGFR 122.7 >60.0 mL/min/1.73   Lipase    Collection Time: 06/18/22  9:29 PM    Specimen: Blood   Result Value Ref Range    Lipase 22 13 - 60 U/L   Lactic Acid, Plasma    Collection Time: 06/18/22  9:29 PM    Specimen: Blood   Result Value Ref Range    Lactate 1.1 0.5 - 2.0 mmol/L   CBC Auto Differential    Collection Time: 06/18/22  9:29 PM    Specimen: Blood   Result Value Ref Range    WBC 8.69 3.40 - 10.80 10*3/mm3    RBC 3.85 3.77 - 5.28 10*6/mm3    Hemoglobin 11.5 (L) 12.0 - 15.9 g/dL    Hematocrit 33.6 (L) 34.0 - 46.6 %    MCV 87.3 79.0 - 97.0 fL    MCH 29.9 26.6 - 33.0 pg    MCHC 34.2 31.5 - 35.7 g/dL    RDW 11.9 (L) 12.3 - 15.4 %    RDW-SD 37.7 37.0 - 54.0 fl    MPV 9.0 6.0 - 12.0 fL     Platelets 167 140 - 450 10*3/mm3    Neutrophil % 84.3 (H) 42.7 - 76.0 %    Lymphocyte % 7.6 (L) 19.6 - 45.3 %    Monocyte % 7.7 5.0 - 12.0 %    Eosinophil % 0.1 (L) 0.3 - 6.2 %    Basophil % 0.0 0.0 - 1.5 %    Immature Grans % 0.3 0.0 - 0.5 %    Neutrophils, Absolute 7.32 (H) 1.70 - 7.00 10*3/mm3    Lymphocytes, Absolute 0.66 (L) 0.70 - 3.10 10*3/mm3    Monocytes, Absolute 0.67 0.10 - 0.90 10*3/mm3    Eosinophils, Absolute 0.01 0.00 - 0.40 10*3/mm3    Basophils, Absolute 0.00 0.00 - 0.20 10*3/mm3    Immature Grans, Absolute 0.03 0.00 - 0.05 10*3/mm3    nRBC 0.0 0.0 - 0.2 /100 WBC   Procalcitonin    Collection Time: 06/18/22  9:29 PM    Specimen: Blood   Result Value Ref Range    Procalcitonin 0.94 (H) 0.00 - 0.25 ng/mL   Ferritin    Collection Time: 06/18/22  9:29 PM    Specimen: Blood   Result Value Ref Range    Ferritin 110.20 13.00 - 150.00 ng/mL   Iron    Collection Time: 06/18/22  9:29 PM    Specimen: Blood   Result Value Ref Range    Iron 11 (L) 37 - 145 mcg/dL   Iron Profile    Collection Time: 06/18/22  9:29 PM    Specimen: Blood   Result Value Ref Range    Iron 11 (L) 37 - 145 mcg/dL    Iron Saturation 3 (L) 20 - 50 %    Transferrin 252 200 - 360 mg/dL    TIBC 375 298 - 536 mcg/dL   COVID-19 and FLU A/B PCR - Swab, Nasopharynx    Collection Time: 06/19/22 12:25 AM    Specimen: Nasopharynx; Swab   Result Value Ref Range    COVID19 Not Detected Not Detected - Ref. Range    Influenza A PCR Not Detected Not Detected    Influenza B PCR Not Detected Not Detected       If labs were ordered, I independently reviewed the results.        RADIOLOGY  CT Abdomen Pelvis With Contrast    Result Date: 6/18/2022  EXAM: CT OF THE ABDOMEN AND PELVIS WITH IV CONTRAST INDICATIONS: Right flank pain, fever, vomiting TECHNIQUE: CT scan of the abdomen and pelvis was performed following the administration of 100 mL Isovue-300 intravenous contrast. CT dose lowering techniques were used, to include: automated exposure control,  adjustment for patient size, and / or use of  iterative reconstruction. COMPARISON: No prior abdominal or pelvic CTs are in the system. FINDINGS: Bones: No destructive osseous lesions. Lung bases: Unremarkable ABDOMEN: Liver: The liver is normal in contour without focal lesion. The portal venous system is patent. Gallbladder and Bile Ducts: Unremarkable CT appearance of the gallbladder. There is no intrahepatic or extrahepatic biliary ductal dilatation. Spleen: There is homogeneous enhancement without focal lesion. Pancreas: The pancreatic parenchyma is unremarkable. There is no pancreatic ductal dilatation. Adrenals: Unremarkable without nodularity. Kidneys: There is no hydroureteronephrosis. There are patchy regions of cortical hypoenhancement in the right kidney and periureteral edema. The left kidney is unremarkable. Vasculature: No evidence of atherosclerosis or aneurysm. Nodes: There is no lymphadenopathy by size criteria. Bowel: The stomach and visualized loops of large and small bowel are unremarkable. An unremarkable appendix is identified. Mesentery/Peritoneum: Trace free fluid is present in the cul-de-sac. Soft Tissues: Unremarkable PELVIS: Pelvic Organs: There is no abnormal pelvic mass. The urinary bladder is unremarkable.     Findings compatible with acute pyelonephritis of the right kidney. There is no evidence of urinary obstruction. Electronically signed by:  Kam Morales M.D.  6/18/2022 7:43 PM Mountain Time      PROCEDURES    Procedures    No orders to display       MEDICATIONS GIVEN IN ER    Medications   sodium chloride 0.9 % flush 10 mL (has no administration in time range)   hydrOXYzine (ATARAX) tablet 25 mg (has no administration in time range)   drospirenone-ethinyl estradiol (WILMA) 3 - 0.02 mg per tablet  1 tablet - PATIENT SUPPLIED (has no administration in time range)   sodium chloride 0.9 % flush 10 mL (10 mL Intravenous Given 6/19/22 0119)   sodium chloride 0.9 % flush 10 mL (has no  administration in time range)   Enoxaparin Sodium (LOVENOX) syringe 40 mg (has no administration in time range)   lactated ringers infusion (75 mL/hr Intravenous New Bag 6/19/22 0118)   acetaminophen (TYLENOL) tablet 650 mg (has no administration in time range)     Or   acetaminophen (TYLENOL) 160 MG/5ML solution 650 mg (has no administration in time range)     Or   acetaminophen (TYLENOL) suppository 650 mg (has no administration in time range)   melatonin tablet 5 mg (has no administration in time range)   cefTRIAXone (ROCEPHIN) 1 g/100 mL 0.9% NS (MBP) (has no administration in time range)   sodium chloride 0.9 % bolus 1,000 mL (0 mL Intravenous Stopped 6/18/22 2201)   cefTRIAXone (ROCEPHIN) 1 g/100 mL 0.9% NS (MBP) (0 g Intravenous Stopped 6/18/22 2201)   acetaminophen (TYLENOL) tablet 1,000 mg (1,000 mg Oral Given 6/18/22 2050)   ketorolac (TORADOL) injection 15 mg (15 mg Intravenous Given 6/18/22 2045)   iopamidol (ISOVUE-300) 61 % injection 100 mL (100 mL Intravenous Given 6/18/22 2111)   sodium chloride 0.9 % bolus 1,000 mL (0 mL Intravenous Stopped 6/19/22 0103)         ED Course as of 06/19/22 0150   Sat Jun 18, 2022 2209 Patient's work-up is remarkable for fever with tachycardia and pyelonephritis visible on CT imaging with corresponding urinary tract infection.  Blood cultures and urine cultures are pending.  She has had no hypotension.  Lactic acid and white count are normal. [MS]   2225 Patient and family understand and concur with inpatient plan.  Dr. Georges accepts inpatient care [MS]      ED Course User Index  [MS] Tessa Eduardo APRN             AS OF 01:50 EDT VITALS:    BP - 134/73  HR - 92  TEMP - 98.4 °F (36.9 °C) (Oral)  O2 SATS - 97%                  DIAGNOSIS  Final diagnoses:   Pyelonephritis, acute         DISPOSITION      admittred       Tessa Eduardo APRN  06/19/22 0153

## 2022-06-20 VITALS
OXYGEN SATURATION: 99 % | WEIGHT: 170 LBS | TEMPERATURE: 99.6 F | HEART RATE: 91 BPM | BODY MASS INDEX: 29.02 KG/M2 | SYSTOLIC BLOOD PRESSURE: 122 MMHG | DIASTOLIC BLOOD PRESSURE: 73 MMHG | RESPIRATION RATE: 18 BRPM | HEIGHT: 64 IN

## 2022-06-20 PROBLEM — D50.9 FE DEFICIENCY ANEMIA: Status: ACTIVE | Noted: 2022-06-20

## 2022-06-20 LAB
ALBUMIN SERPL-MCNC: 3.1 G/DL (ref 3.5–5.2)
ALBUMIN/GLOB SERPL: 1.2 G/DL
ALP SERPL-CCNC: 53 U/L (ref 39–117)
ALT SERPL W P-5'-P-CCNC: 5 U/L (ref 1–33)
ANION GAP SERPL CALCULATED.3IONS-SCNC: 6 MMOL/L (ref 5–15)
AST SERPL-CCNC: 9 U/L (ref 1–32)
BACTERIA SPEC AEROBE CULT: ABNORMAL
BASOPHILS # BLD AUTO: 0.02 10*3/MM3 (ref 0–0.2)
BASOPHILS NFR BLD AUTO: 0.2 % (ref 0–1.5)
BILIRUB SERPL-MCNC: 0.2 MG/DL (ref 0–1.2)
BUN SERPL-MCNC: 7 MG/DL (ref 6–20)
BUN/CREAT SERPL: 9.6 (ref 7–25)
CALCIUM SPEC-SCNC: 8.6 MG/DL (ref 8.6–10.5)
CHLORIDE SERPL-SCNC: 105 MMOL/L (ref 98–107)
CO2 SERPL-SCNC: 27 MMOL/L (ref 22–29)
CREAT SERPL-MCNC: 0.73 MG/DL (ref 0.57–1)
DEPRECATED RDW RBC AUTO: 38.8 FL (ref 37–54)
EGFRCR SERPLBLD CKD-EPI 2021: 118.7 ML/MIN/1.73
EOSINOPHIL # BLD AUTO: 0.04 10*3/MM3 (ref 0–0.4)
EOSINOPHIL NFR BLD AUTO: 0.4 % (ref 0.3–6.2)
ERYTHROCYTE [DISTWIDTH] IN BLOOD BY AUTOMATED COUNT: 11.9 % (ref 12.3–15.4)
GLOBULIN UR ELPH-MCNC: 2.6 GM/DL
GLUCOSE SERPL-MCNC: 100 MG/DL (ref 65–99)
HCT VFR BLD AUTO: 33.7 % (ref 34–46.6)
HGB BLD-MCNC: 11.3 G/DL (ref 12–15.9)
IMM GRANULOCYTES # BLD AUTO: 0.03 10*3/MM3 (ref 0–0.05)
IMM GRANULOCYTES NFR BLD AUTO: 0.3 % (ref 0–0.5)
LYMPHOCYTES # BLD AUTO: 1.9 10*3/MM3 (ref 0.7–3.1)
LYMPHOCYTES NFR BLD AUTO: 21 % (ref 19.6–45.3)
MCH RBC QN AUTO: 30.1 PG (ref 26.6–33)
MCHC RBC AUTO-ENTMCNC: 33.5 G/DL (ref 31.5–35.7)
MCV RBC AUTO: 89.9 FL (ref 79–97)
MONOCYTES # BLD AUTO: 0.89 10*3/MM3 (ref 0.1–0.9)
MONOCYTES NFR BLD AUTO: 9.9 % (ref 5–12)
NEUTROPHILS NFR BLD AUTO: 6.15 10*3/MM3 (ref 1.7–7)
NEUTROPHILS NFR BLD AUTO: 68.2 % (ref 42.7–76)
NRBC BLD AUTO-RTO: 0 /100 WBC (ref 0–0.2)
PLATELET # BLD AUTO: 178 10*3/MM3 (ref 140–450)
PMV BLD AUTO: 9.1 FL (ref 6–12)
POTASSIUM SERPL-SCNC: 4.2 MMOL/L (ref 3.5–5.2)
PROCALCITONIN SERPL-MCNC: 0.6 NG/ML (ref 0–0.25)
PROT SERPL-MCNC: 5.7 G/DL (ref 6–8.5)
RBC # BLD AUTO: 3.75 10*6/MM3 (ref 3.77–5.28)
SODIUM SERPL-SCNC: 138 MMOL/L (ref 136–145)
WBC NRBC COR # BLD: 9.03 10*3/MM3 (ref 3.4–10.8)

## 2022-06-20 PROCEDURE — G0378 HOSPITAL OBSERVATION PER HR: HCPCS

## 2022-06-20 PROCEDURE — 96361 HYDRATE IV INFUSION ADD-ON: CPT

## 2022-06-20 PROCEDURE — 25010000002 ENOXAPARIN PER 10 MG: Performed by: NURSE PRACTITIONER

## 2022-06-20 PROCEDURE — 99217 PR OBSERVATION CARE DISCHARGE MANAGEMENT: CPT | Performed by: INTERNAL MEDICINE

## 2022-06-20 PROCEDURE — 80053 COMPREHEN METABOLIC PANEL: CPT | Performed by: INTERNAL MEDICINE

## 2022-06-20 PROCEDURE — 96372 THER/PROPH/DIAG INJ SC/IM: CPT

## 2022-06-20 PROCEDURE — 85025 COMPLETE CBC W/AUTO DIFF WBC: CPT | Performed by: INTERNAL MEDICINE

## 2022-06-20 PROCEDURE — 84145 PROCALCITONIN (PCT): CPT | Performed by: INTERNAL MEDICINE

## 2022-06-20 RX ORDER — CEFDINIR 300 MG/1
300 CAPSULE ORAL 2 TIMES DAILY
Qty: 10 CAPSULE | Refills: 0 | Status: SHIPPED | OUTPATIENT
Start: 2022-06-20 | End: 2022-06-25

## 2022-06-20 RX ORDER — MULTIVITAMIN
1 CAPSULE ORAL DAILY
Qty: 30 CAPSULE | Refills: 11
Start: 2022-06-20 | End: 2022-07-21

## 2022-06-20 RX ORDER — ACETAMINOPHEN 325 MG/1
650 TABLET ORAL EVERY 4 HOURS PRN
Start: 2022-06-20 | End: 2022-07-21

## 2022-06-20 RX ADMIN — HYDROXYZINE HYDROCHLORIDE 25 MG: 25 TABLET, FILM COATED ORAL at 08:55

## 2022-06-20 RX ADMIN — SODIUM CHLORIDE, POTASSIUM CHLORIDE, SODIUM LACTATE AND CALCIUM CHLORIDE 100 ML/HR: 600; 310; 30; 20 INJECTION, SOLUTION INTRAVENOUS at 03:21

## 2022-06-20 RX ADMIN — ENOXAPARIN SODIUM 40 MG: 40 INJECTION SUBCUTANEOUS at 08:56

## 2022-06-20 RX ADMIN — DROSPIRENONE AND ETHINYL ESTRADIOL 1 TABLET: KIT at 08:55

## 2022-06-20 NOTE — CASE MANAGEMENT/SOCIAL WORK
Continued Stay Note  [facility]     Patient Name: Margaret Palmer  MRN: [id number]  Today's Date: 6/20/2022    Admit Date: 6/18/2022     Discharge Plan     Row Name 06/20/22 1141       Plan    Plan Home    Patient/Family in Agreement with Plan yes    Plan Comments Went to meet with Ms Palmer at the bedside and she had already been DC. Plan home and family transported.    Final Discharge Disposition Code 01 - home or self-care               Discharge Codes    No documentation.               Expected Discharge Date and Time     Expected Discharge Date Expected Discharge Time    Jun 20, 2022             Katie Brown RN

## 2022-06-20 NOTE — PLAN OF CARE
Goal Outcome Evaluation:           Progress: improving  Outcome Evaluation: Patient to discharge home today, Mother at bedside will  her home. VSS, PO intake good.

## 2022-06-20 NOTE — DISCHARGE SUMMARY
[facility]  DISCHARGE SUMMARY    Patient Name: Margaret Palmer  : 2000  MRN: [id number]    Date of Admission: 2022  7:44 PM  Date of Discharge:  22  Primary Care Physician: Provider, No Known    Consults     No orders found from 2022 to 2022.          Hospital Course     Presenting Problem:   Pyelonephritis, acute [N10]    Active Hospital Problems    Diagnosis  POA   • **Pyelonephritis, acute [N10]  Yes     Priority: Medium   • Fe deficiency anemia [D50.9]  Yes   • Anxiety and depression [F41.9, F32.A]  Yes      Resolved Hospital Problems   No resolved problems to display.          Hospital Course:  Margaret Palmer is a 21 y.o. female with a history of anxiety depression, PCOS initially presented to the emergency room with complaints of right-sided back pain and fevers.  Symptoms started a few days prior and has progressively gotten worse.  Recently been seen at [facility] and treated with an unknown antibiotic.  Work-up in the emergency room included a CT scan of her abdomen which showed evidence of right-sided pyelonephritis but no obvious stone or obstruction.  She was febrile on admission.  Started on empiric antibiotics and admitted to the hospitalist service.  On the day of admission she is feeling not much improved.  Fever curve has trended downward.  Urine culture has only grown 25,000 CFU's of lactobacillus which is unlikely to be pathogen.  Culture is likely antibiotic modified from previous outpatient antibiotics and unreliable.  She has clinically improved on Rocephin with so we will transition to oral Omnicef for an additional 5 days.  Of note she says she has had somewhat frequent UTIs over the last few years.  No obvious congenital or anatomical abnormalities on CT scan.  If continues to get UTIs recommend outpatient urology follow-up.  Of note she was found to be mildly anemic with a hemoglobin of 11.5.  Iron  studies were checked and were quite low.  Sounds like she does have some heavy periods which may be contributing, has recently been placed on birth control.  Recommend multivitamin for replacement and follow-up with primary care physician.  Plan of care discussed with patient and the mother at bedside and all questions were answered.      Discharge Follow Up Recommendations:  Follow up PCP 1 week    Day of Discharge     HPI:   Doing well today.  Still has some mild right flank pain but it is improving.  Fever curve has improved.  She wants to go home, mother at bedside and is agreeable.    Review of Systems  Gen- No fevers, chills  CV- No chest pain, palpitations  Resp- No cough, dyspnea  GI- No N/V/D, + abd pain    Vital Signs:   Temp:  [98.2 °F (36.8 °C)-101.3 °F (38.5 °C)] 99.6 °F (37.6 °C)  Heart Rate:  [] 91  Resp:  [15-18] 18  BP: (103-131)/(57-77) 122/73      Physical Exam:  Constitutional: No acute distress, awake, alert, sitting up in bed, age-appropriate  HENT: NCAT, mucous membranes moist  Respiratory: Clear to auscultation bilaterally, respiratory effort normal on room air  Cardiovascular: RRR, no murmurs, rubs, or gallops  Gastrointestinal: Positive bowel sounds, soft, mild right-sided flank pain with percussion  Musculoskeletal: No bilateral ankle edema  Psychiatric: Appropriate affect, cooperative  Neurologic: Oriented x 3, no deficits  Skin: No rashes      Pertinent  and/or Most Recent Results     LAB RESULTS:      Lab 06/20/22  0729 06/19/22  0403 06/18/22  2129   WBC 9.03 7.86 8.69   HEMOGLOBIN 11.3* 11.0* 11.5*   HEMATOCRIT 33.7* 33.0* 33.6*   PLATELETS 178 166 167   NEUTROS ABS 6.15 5.52 7.32*   IMMATURE GRANS (ABS) 0.03 0.02 0.03   LYMPHS ABS 1.90 1.54 0.66*   MONOS ABS 0.89 0.74 0.67   EOS ABS 0.04 0.02 0.01   MCV 89.9 91.9 87.3   PROCALCITONIN 0.60*  --  0.94*   LACTATE  --   --  1.1         Lab 06/20/22  0729 06/19/22  0403 06/19/22  0028 06/18/22  2129   SODIUM 138 136  --  134*    POTASSIUM 4.2 3.9  --  3.5   CHLORIDE 105 107  --  103   CO2 27.0 24.0  --  20.0*   ANION GAP 6.0 5.0  --  11.0   BUN 7 11  --  13   CREATININE 0.73 0.74  --  0.71   EGFR 118.7 116.8  --  122.7   GLUCOSE 100* 140*  --  137*   CALCIUM 8.6 7.9*  --  7.9*   IONIZED CALCIUM  --   --  1.13  --    HEMOGLOBIN A1C  --  4.70*  --   --          Lab 06/20/22  0729 06/18/22 2129   TOTAL PROTEIN 5.7* 6.1   ALBUMIN 3.10* 3.20*   GLOBULIN 2.6 2.9   ALT (SGPT) 5 7   AST (SGOT) 9 11   BILIRUBIN 0.2 0.2   ALK PHOS 53 61   LIPASE  --  22                 Lab 06/19/22  0403 06/18/22 2129   IRON  --  11*  11*   IRON SATURATION  --  3*   TIBC  --  375   TRANSFERRIN  --  252   FERRITIN  --  110.20   FOLATE 11.70  --    VITAMIN B 12 249  --          Brief Urine Lab Results  (Last result in the past 365 days)      Color   Clarity   Blood   Leuk Est   Nitrite   Protein   CREAT   Urine HCG        06/18/22 1952               Negative       06/18/22 1952 Yellow   Clear   Trace   Moderate (2+)   Negative   Negative               Microbiology Results (last 10 days)     Procedure Component Value - Date/Time    COVID PRE-OP / PRE-PROCEDURE SCREENING ORDER (NO ISOLATION) - Swab, Nasopharynx [[id number]]  (Normal) Collected: 06/19/22 0025    Lab Status: Final result Specimen: Swab from Nasopharynx Updated: 06/19/22 0103    Narrative:      The following orders were created for panel order COVID PRE-OP / PRE-PROCEDURE SCREENING ORDER (NO ISOLATION) - Swab, Nasopharynx.  Procedure                               Abnormality         Status                     ---------                               -----------         ------                     COVID-19 and FLU A/B PCR...[[id number]]  Normal              Final result                 Please view results for these tests on the individual orders.    COVID-19 and FLU A/B PCR - Swab, Nasopharynx [[id number]]  (Normal) Collected: 06/19/22 0025    Lab Status: Final result Specimen: Swab from Nasopharynx Updated:  06/19/22 0103     COVID19 Not Detected     Influenza A PCR Not Detected     Influenza B PCR Not Detected    Narrative:      Fact sheet for providers: https://www.fda.gov/media/604406/download    Fact sheet for patients: https://www.fda.gov/media/659623/download    Test performed by PCR.    Blood Culture - Blood, Hand, Right [[id number]]  (Normal) Collected: 06/18/22 2129    Lab Status: Preliminary result Specimen: Blood from Hand, Right Updated: 06/19/22 2217     Blood Culture No growth at 24 hours    Blood Culture - Blood, Arm, Left [[id number]]  (Normal) Collected: 06/18/22 2100    Lab Status: Preliminary result Specimen: Blood from Arm, Left Updated: 06/19/22 2217     Blood Culture No growth at 24 hours    Urine Culture - Urine, Urine, Clean Catch [[id number]]  (Abnormal) Collected: 06/18/22 1952    Lab Status: Final result Specimen: Urine, Clean Catch Updated: 06/20/22 1022     Urine Culture 25,000 CFU/mL Lactobacillus species     Comment: Based on laboratory diagnosis criteria, these organisms are common urogenital commensal shun and have not been associated with urinary tract infections; clinical correlation is recommended.       Narrative:      Colonization of the urinary tract without infection is common. Treatment is discouraged unless the patient is symptomatic, pregnant, or undergoing an invasive urologic procedure.          CT Abdomen Pelvis With Contrast    Result Date: 6/18/2022  EXAM: CT OF THE ABDOMEN AND PELVIS WITH IV CONTRAST INDICATIONS: Right flank pain, fever, vomiting TECHNIQUE: CT scan of the abdomen and pelvis was performed following the administration of 100 mL Isovue-300 intravenous contrast. CT dose lowering techniques were used, to include: automated exposure control, adjustment for patient size, and / or use of  iterative reconstruction. COMPARISON: No prior abdominal or pelvic CTs are in the system. FINDINGS: Bones: No destructive osseous lesions. Lung bases: Unremarkable ABDOMEN: Liver:  The liver is normal in contour without focal lesion. The portal venous system is patent. Gallbladder and Bile Ducts: Unremarkable CT appearance of the gallbladder. There is no intrahepatic or extrahepatic biliary ductal dilatation. Spleen: There is homogeneous enhancement without focal lesion. Pancreas: The pancreatic parenchyma is unremarkable. There is no pancreatic ductal dilatation. Adrenals: Unremarkable without nodularity. Kidneys: There is no hydroureteronephrosis. There are patchy regions of cortical hypoenhancement in the right kidney and periureteral edema. The left kidney is unremarkable. Vasculature: No evidence of atherosclerosis or aneurysm. Nodes: There is no lymphadenopathy by size criteria. Bowel: The stomach and visualized loops of large and small bowel are unremarkable. An unremarkable appendix is identified. Mesentery/Peritoneum: Trace free fluid is present in the cul-de-sac. Soft Tissues: Unremarkable PELVIS: Pelvic Organs: There is no abnormal pelvic mass. The urinary bladder is unremarkable.     Findings compatible with acute pyelonephritis of the right kidney. There is no evidence of urinary obstruction. Electronically signed by:  Kam Morales M.D.  6/18/2022 7:43 PM Mountain Time        Plan for Follow-up of Pending Labs/Results: I will follow up  Pending Labs     Order Current Status    Blood Culture - Blood, Arm, Left Preliminary result    Blood Culture - Blood, Hand, Right Preliminary result        Discharge Details        Discharge Medications      New Medications      Instructions Start Date   acetaminophen 325 MG tablet  Commonly known as: TYLENOL   650 mg, Oral, Every 4 Hours PRN      cefdinir 300 MG capsule  Commonly known as: OMNICEF   300 mg, Oral, 2 Times Daily      multivitamin capsule   1 capsule, Oral, Daily         Continue These Medications      Instructions Start Date   Latrice 3-0.02 MG per tablet  Generic drug: drospirenone-ethinyl estradiol   1 tablet, Oral, Daily              No Known Allergies      Discharge Disposition:  Home or Self Care    Diet:  Hospital:  Diet Order   Procedures   • Diet Regular       Activity:  Activity Instructions     Activity as Tolerated               CODE STATUS:    Code Status and Medical Interventions:   Ordered at: 06/19/22 0048     Level Of Support Discussed With:    Patient     Code Status (Patient has no pulse and is not breathing):    CPR (Attempt to Resuscitate)     Medical Interventions (Patient has pulse or is breathing):    Full Support       Future Appointments   Date Time Provider Department Center   6/30/2022  1:20 PM Sharmin Preston MD [facility]       Additional Instructions for the Follow-ups that You Need to Schedule     Discharge Follow-up with PCP   As directed       Currently Documented PCP:    Provider, No Known    PCP Phone Number:    None     Follow Up Details: PCP 1 week               Arden Salgado MD  06/20/22      Time Spent on Discharge:  I spent  26 minutes on this discharge activity which included: face-to-face encounter with the patient, reviewing the data in the system, coordination of the care with the nursing staff as well as consultants, documentation, and entering orders.

## 2022-06-20 NOTE — PLAN OF CARE
Goal Outcome Evaluation:  Plan of Care Reviewed With: patient        Progress: no change  Outcome Evaluation: VSS, room air. Patient rested overnight. Abx infusing. No fevers. Plan of care discussed with the patient.

## 2022-06-23 LAB
BACTERIA SPEC AEROBE CULT: NORMAL
BACTERIA SPEC AEROBE CULT: NORMAL

## 2022-06-24 DIAGNOSIS — Z30.40 ENCOUNTER FOR REFILL OF PRESCRIPTION FOR CONTRACEPTION: ICD-10-CM

## 2022-06-24 RX ORDER — ETHINYL ESTRADIOL/DROSPIRENONE 0.02-3(28)
1 TABLET ORAL DAILY
Qty: 28 TABLET | Refills: 1 | Status: SHIPPED | OUTPATIENT
Start: 2022-06-24 | End: 2022-07-21 | Stop reason: SDUPTHER

## 2022-06-24 RX ORDER — ETHINYL ESTRADIOL/DROSPIRENONE 0.02-3(28)
TABLET ORAL
Qty: 84 TABLET | Refills: 3 | Status: SHIPPED | OUTPATIENT
Start: 2022-06-24 | End: 2022-06-24 | Stop reason: SDUPTHER

## 2022-07-21 ENCOUNTER — OFFICE VISIT (OUTPATIENT)
Dept: OBSTETRICS AND GYNECOLOGY | Facility: CLINIC | Age: 22
End: 2022-07-21

## 2022-07-21 VITALS
WEIGHT: 192.2 LBS | BODY MASS INDEX: 32.81 KG/M2 | DIASTOLIC BLOOD PRESSURE: 78 MMHG | SYSTOLIC BLOOD PRESSURE: 126 MMHG | HEIGHT: 64 IN

## 2022-07-21 DIAGNOSIS — Z01.419 WOMEN'S ANNUAL ROUTINE GYNECOLOGICAL EXAMINATION: ICD-10-CM

## 2022-07-21 DIAGNOSIS — Z01.419 PAP TEST, AS PART OF ROUTINE GYNECOLOGICAL EXAMINATION: Primary | ICD-10-CM

## 2022-07-21 DIAGNOSIS — Z30.40 ENCOUNTER FOR REFILL OF PRESCRIPTION FOR CONTRACEPTION: ICD-10-CM

## 2022-07-21 PROCEDURE — 99395 PREV VISIT EST AGE 18-39: CPT | Performed by: NURSE PRACTITIONER

## 2022-07-21 RX ORDER — ETHINYL ESTRADIOL/DROSPIRENONE 0.02-3(28)
1 TABLET ORAL DAILY
Qty: 84 TABLET | Refills: 4 | Status: SHIPPED | OUTPATIENT
Start: 2022-07-21

## 2022-07-21 NOTE — PROGRESS NOTES
GYN Annual Exam     CC - Here for annual exam.        HPI  Margaret Palmer is a 21 y.o. female, , who presents for annual well woman exam. Patient's last menstrual period was 2022..  Periods are regular every 25-35 days, lasting 5 days. .  Dysmenorrhea:mild, occurring first 1-2 days of flow.  Patient reports problems with: UTI.  There were no changes to her medical or surgical history since her last visit.. Partner Status: Marital Status: single.  She is sexually active. She has not had new partners.. STD testing recommendations have been explained to the patient and she does desire STD testing. She has not had her HPV vaccines.     Patient stated that she has been dealing with a UTI's since last year. She had 3 UTI's in a row in  and then got on a good regimen of postcoital void, increased water, and probiotic. She didn't have a UTI until last month and that resulted in a kidney infection. Her only symptom was back pain followed by fever and she was hospitalized x 2 days.     Additional OB/GYN History   Current contraception: contraceptive methods: OCP (estrogen/progesterone)  Desires to: continue contraception  Last Pap : 2021. Results: Normall  Last Completed Pap Smear          Ordered - PAP SMEAR (Every 3 Years) Ordered on 2021  SCANNED - PAP SMEAR    2020  Done - negative, G&C negative              History of abnormal Pap smear: no  Family history of uterine, colon, breast, or ovarian cancer: yes - Maternal Great Grandmother has breast cancer  Performs monthly Self-Breast Exam: no  Exercises Regularly:no  Feelings of Anxiety or Depression: yes - Anxiety and Depression  Tobacco Usage?: No   OB History        0    Para   0    Term   0       0    AB   0    Living   0       SAB   0    IAB   0    Ectopic   0    Molar   0    Multiple   0    Live Births   0                Health Maintenance   Topic Date Due   • ANNUAL PHYSICAL  Never done   • HPV  "VACCINES (1 - 2-dose series) Never done   • TDAP/TD VACCINES (1 - Tdap) Never done   • HEPATITIS C SCREENING  Never done   • CHLAMYDIA SCREENING  Never done   • COVID-19 Vaccine (3 - Booster for Pfizer series) 08/11/2021   • INFLUENZA VACCINE  10/01/2022   • Annual Gynecologic Pelvic and Breast Exam  07/22/2023   • PAP SMEAR  06/24/2024   • Pneumococcal Vaccine 0-64  Aged Out       The additional following portions of the patient's history were reviewed and updated as appropriate: allergies, current medications, past family history, past medical history, past social history and past surgical history.    Review of Systems   Constitutional: Negative.    Respiratory: Negative.    Cardiovascular: Negative.    Gastrointestinal: Negative.    Genitourinary: Negative.    Neurological: Negative.    Psychiatric/Behavioral: Negative.          I have reviewed and agree with the HPI, ROS, and historical information as entered above. Faina Louis, APRN    Objective   /78   Ht 162.6 cm (64\")   Wt 87.2 kg (192 lb 3.2 oz)   LMP 06/24/2022   Breastfeeding No   BMI 32.99 kg/m²     Physical Exam  Vitals and nursing note reviewed. Exam conducted with a chaperone present.   Constitutional:       Appearance: She is well-developed.   HENT:      Head: Normocephalic and atraumatic.   Neck:      Thyroid: No thyroid mass or thyromegaly.   Cardiovascular:      Rate and Rhythm: Normal rate and regular rhythm.      Heart sounds: No murmur heard.  Pulmonary:      Effort: Pulmonary effort is normal. No retractions.      Breath sounds: Normal breath sounds. No wheezing, rhonchi or rales.   Chest:      Chest wall: No mass or tenderness.   Breasts:      Right: Normal. No mass, nipple discharge, skin change or tenderness.      Left: Normal. No mass, nipple discharge, skin change or tenderness.       Abdominal:      Palpations: Abdomen is soft. Abdomen is not rigid. There is no mass.      Tenderness: There is no abdominal tenderness. " There is no guarding.      Hernia: No hernia is present. There is no hernia in the left inguinal area.   Genitourinary:     Labia:         Right: No rash, tenderness or lesion.         Left: No rash, tenderness or lesion.       Vagina: Normal. No vaginal discharge or lesions.      Cervix: No cervical motion tenderness, discharge, lesion or cervical bleeding.      Uterus: Normal. Not enlarged, not fixed and not tender.       Adnexa:         Right: No mass or tenderness.          Left: No mass or tenderness.        Rectum: No external hemorrhoid.   Musculoskeletal:      Cervical back: Normal range of motion. No muscular tenderness.   Neurological:      Mental Status: She is alert and oriented to person, place, and time.   Psychiatric:         Behavior: Behavior normal.            Assessment and Plan    Problem List Items Addressed This Visit    None     Visit Diagnoses     Pap test, as part of routine gynecological examination    -  Primary    Relevant Orders    LIQUID-BASED PAP SMEAR, [facility] ([facility])    Women's annual routine gynecological examination        Encounter for refill of prescription for contraception        Relevant Medications    Latrice 3-0.02 MG per tablet        Happy on EMERALD but would like to have menses every 3 months. Instructed to skip placebos x 2 months and then take them the third month for menses. Discussed possibility of BTB. Call prn    1. GYN annual well woman exam.   2. OCP's/Vaginal Ring - Discussed side effects of nausea, BTB, headaches, breast tenderness and slight weight gain in the first three cycles.  Understands risks of blood clots, stroke, and theoretical risk of breast cancer.  Denies family history of blood clots.  3. Reviewed monthly self breast exams.  Instructed to call with lumps, pain, or breast discharge.    Return in about 1 year (around 7/21/2023) for Annual physical.   Offered a urology consult and she declines for now. Will call prn. Discussed if she has another  episode of pyelonephritis I would strongly recommend she proceed with the consult.         Faina Louis, APRN  07/21/2022

## 2022-07-25 LAB — REF LAB TEST METHOD: NORMAL

## 2023-07-27 DIAGNOSIS — Z30.40 ENCOUNTER FOR REFILL OF PRESCRIPTION FOR CONTRACEPTION: ICD-10-CM

## 2023-07-27 RX ORDER — DROSPIRENONE AND ETHINYL ESTRADIOL 0.02-3(28)
KIT ORAL
Qty: 30 TABLET | Refills: 0 | Status: SHIPPED | OUTPATIENT
Start: 2023-07-27

## 2023-08-08 DIAGNOSIS — Z30.40 ENCOUNTER FOR REFILL OF PRESCRIPTION FOR CONTRACEPTION: ICD-10-CM

## 2023-08-08 RX ORDER — DROSPIRENONE AND ETHINYL ESTRADIOL 0.02-3(28)
1 KIT ORAL DAILY
Qty: 30 TABLET | Refills: 0 | Status: SHIPPED | OUTPATIENT
Start: 2023-08-08

## 2023-08-08 NOTE — TELEPHONE ENCOUNTER
"PROVIDER: BRYON VAZQUEZ    Caller: Margaret Palmer \"RIZWAN\"    Relationship: Self    Best call back number: [phone]    Requested Prescriptions:   Requested Prescriptions     Pending Prescriptions Disp Refills    Latrice 3-0.02 MG per tablet 30 tablet 0     Sig: Take 1 tablet by mouth Daily.        Pharmacy where request should be sent:  [facility] [id number] [place] [address] AT [facility] [address] - [phone]  - [phone] FX  Phone: [phone]   Fax: [phone]       Last office visit with prescribing clinician: 7/21/2022   Last telemedicine visit with prescribing clinician: Visit date not found   Next office visit with prescribing clinician: Visit date not found     Additional details provided by patient: SHE IS OUT OF MED HAS ANNUAL R/S FOR 9-1    Does the patient have less than a 3 day supply:  [x] Yes  [] No    Would you like a call back once the refill request has been completed: [] Yes [x] No    If the office needs to give you a call back, can they leave a voicemail: [] Yes [] No    Vinnie Awan Rep   08/08/23 10:07 EDT           "

## 2023-09-29 DIAGNOSIS — Z30.40 ENCOUNTER FOR REFILL OF PRESCRIPTION FOR CONTRACEPTION: ICD-10-CM

## 2023-09-29 RX ORDER — DROSPIRENONE AND ETHINYL ESTRADIOL 0.02-3(28)
1 KIT ORAL DAILY
Qty: 28 TABLET | Refills: 0 | Status: SHIPPED | OUTPATIENT
Start: 2023-09-29

## 2023-10-13 ENCOUNTER — OFFICE VISIT (OUTPATIENT)
Dept: OBSTETRICS AND GYNECOLOGY | Facility: CLINIC | Age: 23
End: 2023-10-13
Payer: COMMERCIAL

## 2023-10-13 VITALS
HEIGHT: 64 IN | WEIGHT: 229.8 LBS | DIASTOLIC BLOOD PRESSURE: 72 MMHG | BODY MASS INDEX: 39.23 KG/M2 | SYSTOLIC BLOOD PRESSURE: 110 MMHG

## 2023-10-13 DIAGNOSIS — Z30.40 ENCOUNTER FOR REFILL OF PRESCRIPTION FOR CONTRACEPTION: ICD-10-CM

## 2023-10-13 DIAGNOSIS — Z01.419 WOMEN'S ANNUAL ROUTINE GYNECOLOGICAL EXAMINATION: Primary | ICD-10-CM

## 2023-10-13 RX ORDER — DROSPIRENONE AND ETHINYL ESTRADIOL 0.02-3(28)
1 KIT ORAL DAILY
Qty: 84 TABLET | Refills: 3 | Status: SHIPPED | OUTPATIENT
Start: 2023-10-13
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
## 2023-10-17 LAB — REF LAB TEST METHOD: NORMAL

## 2024-10-17 DIAGNOSIS — Z30.40 ENCOUNTER FOR REFILL OF PRESCRIPTION FOR CONTRACEPTION: ICD-10-CM

## 2024-10-17 RX ORDER — DROSPIRENONE AND ETHINYL ESTRADIOL 0.02-3(28)
1 KIT ORAL DAILY
Qty: 84 TABLET | Refills: 0 | Status: SHIPPED | OUTPATIENT
Start: 2024-10-17

## 2024-10-22 ENCOUNTER — TELEPHONE (OUTPATIENT)
Dept: OBSTETRICS AND GYNECOLOGY | Facility: CLINIC | Age: 24
End: 2024-10-22
Payer: COMMERCIAL

## 2024-10-22 DIAGNOSIS — Z30.40 ENCOUNTER FOR REFILL OF PRESCRIPTION FOR CONTRACEPTION: ICD-10-CM

## 2024-10-22 RX ORDER — DROSPIRENONE AND ETHINYL ESTRADIOL 0.02-3(28)
1 KIT ORAL DAILY
Qty: 84 TABLET | Refills: 0 | OUTPATIENT
Start: 2024-10-22

## 2024-10-22 RX ORDER — DROSPIRENONE AND ETHINYL ESTRADIOL 0.02-3(28)
1 KIT ORAL DAILY
Qty: 28 TABLET | Refills: 0 | Status: SHIPPED | OUTPATIENT
Start: 2024-10-22

## 2024-11-15 DIAGNOSIS — Z30.40 ENCOUNTER FOR REFILL OF PRESCRIPTION FOR CONTRACEPTION: ICD-10-CM

## 2024-11-15 RX ORDER — DROSPIRENONE AND ETHINYL ESTRADIOL 0.02-3(28)
1 KIT ORAL DAILY
Qty: 28 TABLET | Refills: 0 | OUTPATIENT
Start: 2024-11-15

## 2024-11-15 RX ORDER — DROSPIRENONE AND ETHINYL ESTRADIOL 0.02-3(28)
1 KIT ORAL DAILY
Qty: 28 TABLET | Refills: 0 | Status: SHIPPED | OUTPATIENT
Start: 2024-11-15

## 2024-12-12 ENCOUNTER — OFFICE VISIT (OUTPATIENT)
Dept: OBSTETRICS AND GYNECOLOGY | Facility: CLINIC | Age: 24
End: 2024-12-12
Payer: COMMERCIAL

## 2024-12-12 VITALS
BODY MASS INDEX: 32.91 KG/M2 | SYSTOLIC BLOOD PRESSURE: 100 MMHG | HEIGHT: 64 IN | WEIGHT: 192.8 LBS | DIASTOLIC BLOOD PRESSURE: 74 MMHG

## 2024-12-12 DIAGNOSIS — Z20.2 POSSIBLE EXPOSURE TO STI: ICD-10-CM

## 2024-12-12 DIAGNOSIS — Z30.40 ENCOUNTER FOR REFILL OF PRESCRIPTION FOR CONTRACEPTION: ICD-10-CM

## 2024-12-12 DIAGNOSIS — Z01.419 WOMEN'S ANNUAL ROUTINE GYNECOLOGICAL EXAMINATION: Primary | ICD-10-CM

## 2024-12-12 DIAGNOSIS — N76.0 ACUTE VAGINITIS: ICD-10-CM

## 2024-12-12 RX ORDER — DROSPIRENONE AND ETHINYL ESTRADIOL 0.02-3(28)
1 KIT ORAL DAILY
Qty: 84 TABLET | Refills: 4 | Status: SHIPPED | OUTPATIENT
Start: 2024-12-12

## 2024-12-12 RX ORDER — TRETINOIN 0.25 MG/G
1 CREAM TOPICAL NIGHTLY
COMMUNITY
Start: 2024-12-02
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
## 2024-12-13 LAB
HBV SURFACE AG SERPL QL IA: NEGATIVE
HCV AB SERPL QL IA: NORMAL
HCV IGG SERPL QL IA: NON REACTIVE
HIV 1+2 AB+HIV1 P24 AG SERPL QL IA: NON REACTIVE
HSV1 IGG SERPL QL IA: NON REACTIVE
HSV2 IGG SERPL QL IA: NON REACTIVE
RPR SER QL: NON REACTIVE

## 2024-12-19 LAB — REF LAB TEST METHOD: NORMAL

## 2025-04-01 ENCOUNTER — TELEPHONE (OUTPATIENT)
Dept: OBSTETRICS AND GYNECOLOGY | Facility: CLINIC | Age: 25
End: 2025-04-01
Payer: COMMERCIAL

## 2025-04-01 NOTE — TELEPHONE ENCOUNTER
"HUB UNABLE TO WARM TRANSFER    Caller: Margaret Palmer \"RIZWAN\"    Relationship to patient: Self    Best call back number: [phone]     Type of visit: ULTRASOUND    Requested date: 04/16/2025     Additional notes: PATIENT CALLING TO SCHEDULE AN ULTRASOUND THAT SHAQUILLE SOLORZANO SUGGESTED SHE GET TO EVALUATE ABNORMAL BLEEDING PATIENT IS EXPERIENCING.  PATIENT STATES SHE WANTED TO CHECK WITH HER INSURANCE PRIOR TO SCHEDULING.  PATIENT IS SCHEDULED TO SEE SHAQUILLE SOLORZANO ON 04/16/2025 AND IS NEEDING THIS ULTRASOUND PRIOR TO SEEING SHAQUILLE.        "

## 2025-04-15 DIAGNOSIS — N93.9 ABNORMAL UTERINE BLEEDING (AUB): Primary | ICD-10-CM

## 2025-04-16 ENCOUNTER — OFFICE VISIT (OUTPATIENT)
Dept: OBSTETRICS AND GYNECOLOGY | Facility: CLINIC | Age: 25
End: 2025-04-16
Payer: COMMERCIAL

## 2025-04-16 VITALS
SYSTOLIC BLOOD PRESSURE: 128 MMHG | DIASTOLIC BLOOD PRESSURE: 78 MMHG | HEIGHT: 64 IN | WEIGHT: 195 LBS | BODY MASS INDEX: 33.29 KG/M2

## 2025-04-16 DIAGNOSIS — N93.9 ABNORMAL UTERINE BLEEDING (AUB): Primary | ICD-10-CM

## 2025-04-16 DIAGNOSIS — Z32.02 PREGNANCY EXAMINATION OR TEST, NEGATIVE RESULT: ICD-10-CM
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
## 2025-04-17 LAB — REF LAB TEST METHOD: NORMAL

## 2025-04-21 ENCOUNTER — TELEPHONE (OUTPATIENT)
Dept: OBSTETRICS AND GYNECOLOGY | Facility: CLINIC | Age: 25
End: 2025-04-21
Payer: COMMERCIAL

## 2025-04-21 NOTE — TELEPHONE ENCOUNTER
"Lvom for pt to CB    If patient calls back: (Per Randy lab results)   Libertad,  Your biopsy was negative for hyperplasia or malignancy. \"Proliferative phase endometrium\" means your lining is functioning normally, so it looks like the bleeding with sex is not caused by any concerning endometrial findings. Thanks!  "

## 2025-04-21 NOTE — TELEPHONE ENCOUNTER
Pt called and stated that she has a endometiral biopsy on Wednesday and had a few questions. In the pathology report stated that there is no malignancy she would like to know what is causing the bleeding then and she also expressed concern about infection during the pts appt and would like to know if that is the case